# Patient Record
Sex: FEMALE | Race: WHITE | NOT HISPANIC OR LATINO | Employment: OTHER | URBAN - METROPOLITAN AREA
[De-identification: names, ages, dates, MRNs, and addresses within clinical notes are randomized per-mention and may not be internally consistent; named-entity substitution may affect disease eponyms.]

---

## 2017-11-02 ENCOUNTER — TRANSFERRED RECORDS (OUTPATIENT)
Dept: HEALTH INFORMATION MANAGEMENT | Facility: CLINIC | Age: 82
End: 2017-11-02

## 2017-11-06 ENCOUNTER — APPOINTMENT (OUTPATIENT)
Dept: CT IMAGING | Facility: CLINIC | Age: 82
End: 2017-11-06
Attending: PHYSICIAN ASSISTANT
Payer: MEDICARE

## 2017-11-06 ENCOUNTER — TRANSFERRED RECORDS (OUTPATIENT)
Dept: HEALTH INFORMATION MANAGEMENT | Facility: CLINIC | Age: 82
End: 2017-11-06

## 2017-11-06 ENCOUNTER — HOSPITAL ENCOUNTER (EMERGENCY)
Facility: CLINIC | Age: 82
Discharge: HOME | End: 2017-11-06
Attending: PHYSICIAN ASSISTANT | Admitting: PHYSICIAN ASSISTANT
Payer: MEDICARE

## 2017-11-06 VITALS
OXYGEN SATURATION: 99 % | TEMPERATURE: 97.7 F | HEART RATE: 67 BPM | DIASTOLIC BLOOD PRESSURE: 69 MMHG | SYSTOLIC BLOOD PRESSURE: 145 MMHG | RESPIRATION RATE: 16 BRPM

## 2017-11-06 DIAGNOSIS — R55 SYNCOPE AND COLLAPSE: ICD-10-CM

## 2017-11-06 LAB
ALBUMIN UR-MCNC: NEGATIVE MG/DL
ANION GAP SERPL CALCULATED.3IONS-SCNC: 7 MMOL/L (ref 3–14)
APPEARANCE UR: CLEAR
BASOPHILS # BLD AUTO: 0.1 10E9/L (ref 0–0.2)
BASOPHILS NFR BLD AUTO: 0.8 %
BILIRUB UR QL STRIP: NEGATIVE
BUN SERPL-MCNC: 30 MG/DL (ref 7–30)
CALCIUM SERPL-MCNC: 8.9 MG/DL (ref 8.5–10.1)
CHLORIDE SERPL-SCNC: 102 MMOL/L (ref 94–109)
CO2 SERPL-SCNC: 28 MMOL/L (ref 20–32)
COLOR UR AUTO: YELLOW
CREAT SERPL-MCNC: 1.03 MG/DL (ref 0.52–1.04)
D DIMER PPP FEU-MCNC: 0.4 UG/ML FEU (ref 0–0.5)
DIFFERENTIAL METHOD BLD: ABNORMAL
EOSINOPHIL # BLD AUTO: 0 10E9/L (ref 0–0.7)
EOSINOPHIL NFR BLD AUTO: 0.3 %
ERYTHROCYTE [DISTWIDTH] IN BLOOD BY AUTOMATED COUNT: 12.5 % (ref 10–15)
GFR SERPL CREATININE-BSD FRML MDRD: 50 ML/MIN/1.7M2
GLUCOSE SERPL-MCNC: 158 MG/DL (ref 70–99)
GLUCOSE UR STRIP-MCNC: NEGATIVE MG/DL
HCT VFR BLD AUTO: 39.3 % (ref 35–47)
HGB BLD-MCNC: 13 G/DL (ref 11.7–15.7)
HGB UR QL STRIP: NEGATIVE
IMM GRANULOCYTES # BLD: 0.1 10E9/L (ref 0–0.4)
IMM GRANULOCYTES NFR BLD: 0.5 %
INTERPRETATION ECG - MUSE: NORMAL
KETONES UR STRIP-MCNC: NEGATIVE MG/DL
LEUKOCYTE ESTERASE UR QL STRIP: ABNORMAL
LYMPHOCYTES # BLD AUTO: 1 10E9/L (ref 0.8–5.3)
LYMPHOCYTES NFR BLD AUTO: 9.3 %
MCH RBC QN AUTO: 28.5 PG (ref 26.5–33)
MCHC RBC AUTO-ENTMCNC: 33.1 G/DL (ref 31.5–36.5)
MCV RBC AUTO: 86 FL (ref 78–100)
MONOCYTES # BLD AUTO: 0.7 10E9/L (ref 0–1.3)
MONOCYTES NFR BLD AUTO: 6.3 %
MUCOUS THREADS #/AREA URNS LPF: PRESENT /LPF
NEUTROPHILS # BLD AUTO: 9.2 10E9/L (ref 1.6–8.3)
NEUTROPHILS NFR BLD AUTO: 82.8 %
NITRATE UR QL: NEGATIVE
NRBC # BLD AUTO: 0 10*3/UL
NRBC BLD AUTO-RTO: 0 /100
PH UR STRIP: 6 PH (ref 5–7)
PLATELET # BLD AUTO: 275 10E9/L (ref 150–450)
POTASSIUM SERPL-SCNC: 4 MMOL/L (ref 3.4–5.3)
RBC # BLD AUTO: 4.56 10E12/L (ref 3.8–5.2)
RBC #/AREA URNS AUTO: 1 /HPF (ref 0–2)
SODIUM SERPL-SCNC: 137 MMOL/L (ref 133–144)
SOURCE: ABNORMAL
SP GR UR STRIP: 1.01 (ref 1–1.03)
SQUAMOUS #/AREA URNS AUTO: 1 /HPF (ref 0–1)
TRANS CELLS #/AREA URNS HPF: <1 /HPF (ref 0–1)
TROPONIN I SERPL-MCNC: <0.015 UG/L (ref 0–0.04)
UROBILINOGEN UR STRIP-MCNC: 0 MG/DL (ref 0–2)
WBC # BLD AUTO: 11.1 10E9/L (ref 4–11)
WBC #/AREA URNS AUTO: 1 /HPF (ref 0–2)

## 2017-11-06 PROCEDURE — 85025 COMPLETE CBC W/AUTO DIFF WBC: CPT | Performed by: PHYSICIAN ASSISTANT

## 2017-11-06 PROCEDURE — 96361 HYDRATE IV INFUSION ADD-ON: CPT

## 2017-11-06 PROCEDURE — 93005 ELECTROCARDIOGRAM TRACING: CPT

## 2017-11-06 PROCEDURE — 99285 EMERGENCY DEPT VISIT HI MDM: CPT | Mod: 25

## 2017-11-06 PROCEDURE — 81001 URINALYSIS AUTO W/SCOPE: CPT | Performed by: PHYSICIAN ASSISTANT

## 2017-11-06 PROCEDURE — 70450 CT HEAD/BRAIN W/O DYE: CPT

## 2017-11-06 PROCEDURE — 96360 HYDRATION IV INFUSION INIT: CPT

## 2017-11-06 PROCEDURE — 80048 BASIC METABOLIC PNL TOTAL CA: CPT | Performed by: PHYSICIAN ASSISTANT

## 2017-11-06 PROCEDURE — 84484 ASSAY OF TROPONIN QUANT: CPT | Performed by: PHYSICIAN ASSISTANT

## 2017-11-06 PROCEDURE — 85379 FIBRIN DEGRADATION QUANT: CPT | Performed by: PHYSICIAN ASSISTANT

## 2017-11-06 PROCEDURE — 25000128 H RX IP 250 OP 636: Performed by: PHYSICIAN ASSISTANT

## 2017-11-06 RX ORDER — SODIUM CHLORIDE 9 MG/ML
1000 INJECTION, SOLUTION INTRAVENOUS CONTINUOUS
Status: DISCONTINUED | OUTPATIENT
Start: 2017-11-06 | End: 2017-11-06 | Stop reason: HOSPADM

## 2017-11-06 RX ADMIN — SODIUM CHLORIDE 1000 ML: 9 INJECTION, SOLUTION INTRAVENOUS at 11:37

## 2017-11-06 ASSESSMENT — ENCOUNTER SYMPTOMS
NUMBNESS: 0
FACIAL ASYMMETRY: 0
DIZZINESS: 0
LIGHT-HEADEDNESS: 0
CONFUSION: 0
WEAKNESS: 0
WOUND: 0
HEADACHES: 0

## 2017-11-06 NOTE — ED NOTES
Bed: ED20  Expected date: 11/6/17  Expected time: 10:50 AM  Means of arrival: Ambulance  Comments:  BV1

## 2017-11-06 NOTE — DISCHARGE INSTRUCTIONS
Possible Causes of Dizziness or Fainting    Dizziness and fainting can have many causes. Below are some examples of possible causes your healthcare provider will look to rule out.  Benign paroxysmal positional vertigo (BPPV)  BPPV results when calcium crystals inside the inner ear shift into the wrong position. BPPV causes episodes of vertigo (a spinning sensation). Episodes most often happen when the head is moved in a certain way. This is more common in people 65 and older.   Infection or inflammation  The semicircular canals of the ear may become infected or inflamed. In this case, they can send the wrong balance signals. This can cause vertigo.  Meniere disease  Meniere disease happens when there is too much fluid in the semicircular canals. This can cause vertigo. It also can cause hearing problems and buzzing or ringing in the ears (called tinnitus). You may also have a feeling of pressure or fullness in the ear.  Syncope  Syncope is fainting that happens when the brain doesn t get enough oxygen-rich blood. It can be caused by low heart rate or low blood pressure. This is called vasovagal syncope. It can also be caused by sitting or standing up too quickly. This is called orthostatic hypotension. Syncope may also be due to a heart valve problem, an abnormal heart rhythm, or other heart problems. Dizziness can also happen from stroke, hemorrhage in the brain, or other problems in the brain. Your healthcare provider may do certain tests to rule out these conditions.  Other causes  Other causes include:    Medicines. Certain medicines can cause dizziness and even fainting. In some cases, stopping a medicine too quickly can lead to withdrawal symptoms, including dizziness and fainting.    Anxiety. Being anxious can lead to breathing changes, such as hyperventilation. These can lead to dizziness and fainting.  Additional causes for dizziness and fainting also exist. Talk to your healthcare provider for more  information.     Date Last Reviewed: 10/6/2015    6166-3831 [employer]. [address] [place] [address], [place] [address]. All rights reserved. This information is not intended as a substitute for professional medical care. Always follow your healthcare professional's instructions.

## 2017-11-06 NOTE — ED NOTES
"Assumed patient care for RN break. Patient is alert and oriented. Very pleasant. States she feels \"fine now\". Patient requesting to use the restroom. ERT assisting pt to bathroom.   "

## 2017-11-06 NOTE — ED AVS SNAPSHOT
[facility]    [address]    [facility] [address]    Phone:  [phone]    Fax:  [phone]                                       Rebeca Izaguirre   MRN: [id number]    Department:  [facility]   Date of Visit:  11/6/2017           After Visit Summary Signature Page     I have received my discharge instructions, and my questions have been answered. I have discussed any challenges I see with this plan with the nurse or doctor.    ..........................................................................................................................................  Patient/Patient Representative Signature      ..........................................................................................................................................  Patient Representative Print Name and Relationship to Patient    ..................................................               ................................................  Date                                            Time    ..........................................................................................................................................  Reviewed by Signature/Title    ...................................................              ..............................................  Date                                                            Time

## 2017-11-06 NOTE — ED NOTES
Pt arrived in the ED via [facility] with reports of syncope; pt just ahd cataract surgery, went to the bathroom after being discharged and while waiting for the elevator, passed out.  Pt reportedly received insulin at the surgery center, she controls her diabetes with diet.  Pt is awake, alert, and oriented x4; pt is ambulatory with a steady gait.

## 2017-11-06 NOTE — ED PROVIDER NOTES
"  History     Chief Complaint:  Loss of Consciousness    HPI   Rebeca Izaguirre is a 89 year old female who presents via EMS with loss of consciousness. The patient states she had cataract surgery this morning, and she went to the bathroom after sitting in the surgery office for about 1 hour to make sure she was awake enough from the medications to go home. She walked to the elevators without problem, went to the bathroom and then walked back to the elevators to go home. This whole time she was feeling fine. Shortly after she developed feeling of wooziness so she walked over to sit down. The last thing she remembers is sitting down on the bench and seeing her son walk over to her. Her son, who was with her, watched her sit down and she started swaying so he went over to catch her and bring her down to the ground gently as she lost consciousness. The patient woke up quickly and remembers seeing \"a bunch of people staring at me\".  She states she was feeling fine after her event. The patient denies pain anywhere, confusion, headaches, weakness, slurred speech, facial asymmetry, history of heart problems, and states no other concerns at this time. She did not hit her head or harm herself. She states she did not have breakfast, and was given versed and ketamine before her surgery this morning.     Allergies:  No known drug allergies.      Medications:    Amlodipine 10mg   Asprin 81 mg  Lisinopril 20 mg   MVT  Simvastatin 20 mg    Past Medical History:    Diabetes  Hypertension  Cataracts   High cholesterol     Past Surgical History:    Cataract surgery     Family History:    History reviewed. No pertinent family history.     Social History:  Presents to the emergency department with her son.    Marital Status:    Smoking status: Never smoker   Alcohol use: No     Review of Systems   Skin: Negative for wound.   Neurological: Positive for syncope. Negative for dizziness, facial asymmetry, weakness, light-headedness, " numbness and headaches.   Psychiatric/Behavioral: Negative for behavioral problems and confusion.   All other systems reviewed and are negative.    Physical Exam     Patient Vitals for the past 24 hrs:   BP Temp Temp src Pulse Heart Rate Resp SpO2   11/06/17 1430 145/69 - - - 85 16 99 %   11/06/17 1245 149/74 - - - 84 (!) 32 99 %   11/06/17 1200 (!) 140/99 - - - 84 16 98 %   11/06/17 1130 144/72 - - - 72 18 98 %   11/06/17 1056 144/70 97.7  F (36.5  C) Oral 67 - 16 96 %      Physical Exam  Constitutional: Alert, attentive  HENT:    Nose: Nose normal.    Mouth/Throat: Oropharynx is clear, mucous membranes are moist  Eyes: EOM are normal. Pupils are equal, round, and reactive to light.   CV: Regular rate and rhythm. No murmur heard.  Chest: Effort normal and breath sounds normal.   GI: No distension. There is no tenderness  MSK: Normal range of motion.   Neurological:   GCS 15; A/Ox3; Cranial nerves 2-12 intact;   5/5 strength throughout the upper and lower extremities;   sensation intact to light touch throughout the upper and lower extremities;   normal fine motor coordination intact bilaterally;   normal gait   No meningismus   Skin: Skin is warm and dry.     Emergency Department Course     ECG:  @ 1141  Indication: Loss of consciousness    Vent. Rate 76 bpm. MI interval 156 ms. QRS duration 62 ms. QT/QTc 392/441 ms. P-R-T axis 77 7 29.   NSR, normal ECG.    Read @ 1145 by Dr. Medel.     Imaging:  Radiology findings were communicated with the patient and family who voiced understanding of the findings.  CT Head w/o Contrast   Final Result   IMPRESSION:      1. No evidence of acute intracranial hemorrhage, mass, or herniation.   2. There is generalized atrophy of the brain. White matter changes are   present in the cerebral hemispheres that are consistent with small   vessel ischemic disease in this age patient.      JOHN PEGUERO MD         Laboratory:  Laboratory findings were communicated with the patient and  family who voiced understanding of the findings.  UA: Clear yellow urine, trace leuk-esterase, mucus present, otherwise WNL  CBC: WBC 11.1 (H), o/w WNL. (HGB 13.0, )   (1134) D Dimer: 0.4  BMP: Glucose 158 (H), GFR 50 (L) o/w WNL (Creatinine 1.03)    Troponin (Collected 1134): <0.015     Interventions:  1137: NS, 1 L, IV     Emergency Department Course:  Nursing notes and vitals reviewed.  I performed an exam of the patient as documented above.   The patient was sent for a head CT without contrast while in the emergency department, results above.   IV was inserted and blood was drawn for laboratory testing, results above.  The patient provided a urine sample here in the emergency department. This was sent for laboratory testing, findings above.  1456: Patient rechecked and updated.    Findings and plan explained to the patient and her son. Patient discharged home with instructions regarding supportive care, medications, and reasons to return. The importance of close follow-up was reviewed.    I personally reviewed the laboratory and imaging results with the Patient and son and answered all related questions prior to discharge.      Impression & Plan      Medical Decision Making:   Rebeca Izaguirre is a 89 year old female who presents to the emergency department today for evaluation after a syncopal event. While vasovagal versus side effect of the Versed from surgery was the most likely etiology given the history of this patient, I considered a broad differential for their syncope today including cardiac arrythmia, ACS, aortic stenosis, HOCM, PE, orthostatic hypotension, drugs, situational, carotid hypersensitivity, seizure, TIA, stroke, vasovagal. She has no signs of a concerning etiology for syncope at this point. Did not hear any murmur on exam. In addition, she has no family history of sudden death, no chest pain, no seizure activity or post-ictal period, no murmur, and no signs of orthostasis in the ED, no  focal neurologic symptoms, and no complaints of concerning headache. The workup in the ED is negative and the physical exam is re-assurring. She has ambulated by herself and without difficulty several times to the bathroom and has been feeling well and normal since she fainted. She has no other injuries that require work up. Supportive outpatient management is therefore indicated.      Diagnosis:    ICD-10-CM    1. Syncope and collapse R55     likely vasovagal after cataract surgery      Disposition:   Discharged to home      Scribe Disclosure:  Kevin GARCIA, am serving as a scribe at 11:04 AM on 11/6/2017 to document services personally performed by Yuko Min*, based on my observations and the provider's statements to me.   [facility] EMERGENCY DEPARTMENT     Yuko Min PA-C  11/06/17 1812

## 2017-11-06 NOTE — ED AVS SNAPSHOT
[facility]    [address]    [facility] [address]    Phone:  [phone]    Fax:  [phone]                                       Rebeca Izaguirre   MRN: [id number]    Department:  [facility]   Date of Visit:  11/6/2017           Patient Information     Date Of Birth          1/28/1928        Your diagnoses for this visit were:     Syncope and collapse likely vasovagal after cataract surgery       You were seen by Yuko Min PA-C.      Follow-up Information     Follow up with Ana Maria Jordan MD In 2 days.    Specialty:  Family Practice    Contact information:    [address] [address] [phone]          Follow up with [facility].    Specialty:  EMERGENCY MEDICINE    Why:  If symptoms worsen    Contact information:    [address]  [facility] [address] [phone]        Discharge Instructions         Possible Causes of Dizziness or Fainting    Dizziness and fainting can have many causes. Below are some examples of possible causes your healthcare provider will look to rule out.  Benign paroxysmal positional vertigo (BPPV)  BPPV results when calcium crystals inside the inner ear shift into the wrong position. BPPV causes episodes of vertigo (a spinning sensation). Episodes most often happen when the head is moved in a certain way. This is more common in people 65 and older.   Infection or inflammation  The semicircular canals of the ear may become infected or inflamed. In this case, they can send the wrong balance signals. This can cause vertigo.  Meniere disease  Meniere disease happens when there is too much fluid in the semicircular canals. This can cause vertigo. It also can cause hearing problems and buzzing or ringing in the ears (called tinnitus). You may also have a feeling of pressure or fullness in the  ear.  Syncope  Syncope is fainting that happens when the brain doesn t get enough oxygen-rich blood. It can be caused by low heart rate or low blood pressure. This is called vasovagal syncope. It can also be caused by sitting or standing up too quickly. This is called orthostatic hypotension. Syncope may also be due to a heart valve problem, an abnormal heart rhythm, or other heart problems. Dizziness can also happen from stroke, hemorrhage in the brain, or other problems in the brain. Your healthcare provider may do certain tests to rule out these conditions.  Other causes  Other causes include:    Medicines. Certain medicines can cause dizziness and even fainting. In some cases, stopping a medicine too quickly can lead to withdrawal symptoms, including dizziness and fainting.    Anxiety. Being anxious can lead to breathing changes, such as hyperventilation. These can lead to dizziness and fainting.  Additional causes for dizziness and fainting also exist. Talk to your healthcare provider for more information.     Date Last Reviewed: 10/6/2015    5633-8836 [employer]. [address] [place] [address]. All rights reserved. This information is not intended as a substitute for professional medical care. Always follow your healthcare professional's instructions.          24 Hour Appointment Hotline       To make an appointment at any [facility], call [phone] ([phone]). If you don't have a family doctor or clinic, we will help you find one. [place] clinics are conveniently located to serve the needs of you and your family.             Review of your medicines      Notice     You have not been prescribed any medications.            Procedures and tests performed during your visit     Procedure/Test Number of Times Performed    Basic metabolic panel 1    CBC with platelets differential 1    CT Head w/o Contrast 1    Cardiac Continuous Monitoring 2    D dimer quantitative 1     EKG 12-lead, tracing only 1    Peripheral IV: Standard 1    Troponin I 1    UA with Microscopic 1      Orders Needing Specimen Collection     None      Pending Results     No orders found from 11/4/2017 to 11/7/2017.            Pending Culture Results     No orders found from 11/4/2017 to 11/7/2017.            Pending Results Instructions     If you had any lab results that were not finalized at the time of your Discharge, you can call the ED Lab Result RN at [phone]. You will be contacted by this team for any positive Lab results or changes in treatment. The nurses are available 7 days a week from 10A to 6:30P.  You can leave a message 24 hours per day and they will return your call.        Test Results From Your Hospital Stay        11/6/2017 12:11 PM      Component Results     Component Value Ref Range & Units Status    WBC 11.1 (H) 4.0 - 11.0 10e9/L Final    RBC Count 4.56 3.8 - 5.2 10e12/L Final    Hemoglobin 13.0 11.7 - 15.7 g/dL Final    Hematocrit 39.3 35.0 - 47.0 % Final    MCV 86 78 - 100 fl Final    MCH 28.5 26.5 - 33.0 pg Final    MCHC 33.1 31.5 - 36.5 g/dL Final    RDW 12.5 10.0 - 15.0 % Final    Platelet Count 275 150 - 450 10e9/L Final    Diff Method Automated Method  Final    % Neutrophils 82.8 % Final    % Lymphocytes 9.3 % Final    % Monocytes 6.3 % Final    % Eosinophils 0.3 % Final    % Basophils 0.8 % Final    % Immature Granulocytes 0.5 % Final    Nucleated RBCs 0 0 /100 Final    Absolute Neutrophil 9.2 (H) 1.6 - 8.3 10e9/L Final    Absolute Lymphocytes 1.0 0.8 - 5.3 10e9/L Final    Absolute Monocytes 0.7 0.0 - 1.3 10e9/L Final    Absolute Eosinophils 0.0 0.0 - 0.7 10e9/L Final    Absolute Basophils 0.1 0.0 - 0.2 10e9/L Final    Abs Immature Granulocytes 0.1 0 - 0.4 10e9/L Final    Absolute Nucleated RBC 0.0  Final         11/6/2017 12:27 PM      Component Results     Component Value Ref Range & Units Status    D Dimer 0.4 0.0 - 0.50 ug/ml FEU Final    This D-dimer assay is intended for  use in conjunction with a clinical pretest   probability assessment model to exclude pulmonary embolism (PE) and deep   venous thrombosis (DVT) in outpatients suspected of PE or DVT. The cut-off   value is 0.5 ug/mL FEU.           11/6/2017 12:31 PM      Component Results     Component Value Ref Range & Units Status    Sodium 137 133 - 144 mmol/L Final    Potassium 4.0 3.4 - 5.3 mmol/L Final    Chloride 102 94 - 109 mmol/L Final    Carbon Dioxide 28 20 - 32 mmol/L Final    Anion Gap 7 3 - 14 mmol/L Final    Glucose 158 (H) 70 - 99 mg/dL Final    Urea Nitrogen 30 7 - 30 mg/dL Final    Creatinine 1.03 0.52 - 1.04 mg/dL Final    GFR Estimate 50 (L) >60 mL/min/1.7m2 Final    Non  GFR Calc    GFR Estimate If Black 61 >60 mL/min/1.7m2 Final    African American GFR Calc    Calcium 8.9 8.5 - 10.1 mg/dL Final         11/6/2017 12:31 PM      Component Results     Component Value Ref Range & Units Status    Troponin I ES <0.015 0.000 - 0.045 ug/L Final    The 99th percentile for upper reference range is 0.045 ug/L.  Troponin values   in the range of 0.045 - 0.120 ug/L may be associated with risks of adverse   clinical events.           11/6/2017  1:40 PM      Component Results     Component Value Ref Range & Units Status    Color Urine Yellow  Final    Appearance Urine Clear  Final    Glucose Urine Negative NEG^Negative mg/dL Final    Bilirubin Urine Negative NEG^Negative Final    Ketones Urine Negative NEG^Negative mg/dL Final    Specific Gravity Urine 1.009 1.003 - 1.035 Final    Blood Urine Negative NEG^Negative Final    pH Urine 6.0 5.0 - 7.0 pH Final    Protein Albumin Urine Negative NEG^Negative mg/dL Final    Urobilinogen mg/dL 0.0 0.0 - 2.0 mg/dL Final    Nitrite Urine Negative NEG^Negative Final    Leukocyte Esterase Urine Trace (A) NEG^Negative Final    Source Midstream Urine  Final    WBC Urine 1 0 - 2 /HPF Final    RBC Urine 1 0 - 2 /HPF Final    Squamous Epithelial /HPF Urine 1 0 - 1 /HPF Final     Transitional Epi <1 0 - 1 /HPF Final    Mucous Urine Present (A) NEG^Negative /LPF Final         11/6/2017 12:11 PM      Narrative     CT SCAN OF THE HEAD WITHOUT CONTRAST   11/6/2017 12:00 PM     HISTORY: Syncope.    TECHNIQUE:  Axial images of the head and coronal reformations without  IV contrast material. Radiation dose for this scan was reduced using  automated exposure control, adjustment of the mA and/or kV according  to patient size, or iterative reconstruction technique.    COMPARISON: None.    FINDINGS: There is no evidence of intracranial hemorrhage, mass, acute  infarct or anomaly. There is generalized atrophy of the brain. There  is low attenuation in the white matter of the cerebral hemispheres  consistent with sequelae of small vessel ischemic disease.    The visualized portions of the sinuses and mastoids appear normal. The  bony calvarium and bones of the skull base appear intact. There are  bilateral intraocular lens implants.        Impression     IMPRESSION:     1. No evidence of acute intracranial hemorrhage, mass, or herniation.  2. There is generalized atrophy of the brain. White matter changes are  present in the cerebral hemispheres that are consistent with small  vessel ischemic disease in this age patient.    JOHN PEGUERO MD                Clinical Quality Measure: Blood Pressure Screening     Your blood pressure was checked while you were in the emergency department today. The last reading we obtained was  BP: 145/69 . Please read the guidelines below about what these numbers mean and what you should do about them.  If your systolic blood pressure (the top number) is less than 120 and your diastolic blood pressure (the bottom number) is less than 80, then your blood pressure is normal. There is nothing more that you need to do about it.  If your systolic blood pressure (the top number) is 120-139 or your diastolic blood pressure (the bottom number) is 80-89, your blood pressure may be  "higher than it should be. You should have your blood pressure rechecked within a year by a primary care provider.  If your systolic blood pressure (the top number) is 140 or greater or your diastolic blood pressure (the bottom number) is 90 or greater, you may have high blood pressure. High blood pressure is treatable, but if left untreated over time it can put you at risk for heart attack, stroke, or kidney failure. You should have your blood pressure rechecked by a primary care provider within the next 4 weeks.  If your provider in the emergency department today gave you specific instructions to follow-up with your doctor or provider even sooner than that, you should follow that instruction and not wait for up to 4 weeks for your follow-up visit.        Thank you for choosing [facility]       Thank you for choosing [facility] for your care. Our goal is always to provide you with excellent care. Hearing back from our patients is one way we can continue to improve our services. Please take a few minutes to complete the written survey that you may receive in the mail after you visit with us. Thank you!        "[employer] Information     [employer] lets you send messages to your doctor, view your test results, renew your prescriptions, schedule appointments and more. To sign up, go to [email or web address] . Click on \"Log in\" on the left side of the screen, which will take you to the Welcome page. Then click on \"Sign up Now\" on the right side of the page.     You will be asked to enter the access code listed below, as well as some personal information. Please follow the directions to create your username and password.     Your access code is: [email or web address]  Expires: 2018  3:02 PM     Your access code will  in 90 days. If you need help or a new code, please call your [facility] clinic or [phone].        Care EveryWhere ID     This is your Care EveryWhere ID. This could be used by other organizations to access your " [facility] medical records  [id number]        Equal Access to Services     RONNI ISABEL : Hadii jayla Barba, chiqui sawyer, della lopez, joe nathan. So [facility] [phone].    ATENCIÓN: Si habla español, tiene a thao disposición servicios gratuitos de asistencia lingüística. Llame al [phone].    We comply with applicable federal civil rights laws and [place] laws. We do not discriminate on the basis of race, color, national origin, age, disability, sex, sexual orientation, or gender identity.            After Visit Summary       This is your record. Keep this with you and show to your community pharmacist(s) and doctor(s) at your next visit.

## 2017-11-06 NOTE — ED NOTES
Pt discharged at this time; instructions understood, no prescriptions given.  Pt is awake, alert, and oriented x4; pt is ambulatory with a steady gait.  Pt accompanied by family home.

## 2018-03-18 ENCOUNTER — HOSPITAL ENCOUNTER (INPATIENT)
Facility: CLINIC | Age: 83
LOS: 3 days | Discharge: HOME | DRG: 394 | End: 2018-03-21
Attending: EMERGENCY MEDICINE | Admitting: INTERNAL MEDICINE
Payer: MEDICARE

## 2018-03-18 ENCOUNTER — APPOINTMENT (OUTPATIENT)
Dept: CT IMAGING | Facility: CLINIC | Age: 83
DRG: 394 | End: 2018-03-18
Attending: EMERGENCY MEDICINE
Payer: MEDICARE

## 2018-03-18 DIAGNOSIS — K92.1 HEMATOCHEZIA: ICD-10-CM

## 2018-03-18 PROBLEM — K92.2 GI BLEEDING: Status: ACTIVE | Noted: 2018-03-18

## 2018-03-18 LAB
ABO + RH BLD: NORMAL
ABO + RH BLD: NORMAL
ALBUMIN SERPL-MCNC: 3.9 G/DL (ref 3.4–5)
ALP SERPL-CCNC: 79 U/L (ref 40–150)
ALT SERPL W P-5'-P-CCNC: 28 U/L (ref 0–50)
ANION GAP SERPL CALCULATED.3IONS-SCNC: 8 MMOL/L (ref 3–14)
AST SERPL W P-5'-P-CCNC: 20 U/L (ref 0–45)
BASOPHILS # BLD AUTO: 0 10E9/L (ref 0–0.2)
BASOPHILS NFR BLD AUTO: 0.2 %
BILIRUB SERPL-MCNC: 0.8 MG/DL (ref 0.2–1.3)
BLD GP AB SCN SERPL QL: NORMAL
BLOOD BANK CMNT PATIENT-IMP: NORMAL
BUN SERPL-MCNC: 28 MG/DL (ref 7–30)
C DIFF TOX B STL QL: NEGATIVE
CALCIUM SERPL-MCNC: 8.9 MG/DL (ref 8.5–10.1)
CHLORIDE SERPL-SCNC: 98 MMOL/L (ref 94–109)
CO2 SERPL-SCNC: 25 MMOL/L (ref 20–32)
CREAT SERPL-MCNC: 0.87 MG/DL (ref 0.52–1.04)
DIFFERENTIAL METHOD BLD: ABNORMAL
EOSINOPHIL # BLD AUTO: 0 10E9/L (ref 0–0.7)
EOSINOPHIL NFR BLD AUTO: 0 %
ERYTHROCYTE [DISTWIDTH] IN BLOOD BY AUTOMATED COUNT: 13.2 % (ref 10–15)
GFR SERPL CREATININE-BSD FRML MDRD: 61 ML/MIN/1.7M2
GLUCOSE BLDC GLUCOMTR-MCNC: 141 MG/DL (ref 70–99)
GLUCOSE BLDC GLUCOMTR-MCNC: 157 MG/DL (ref 70–99)
GLUCOSE SERPL-MCNC: 207 MG/DL (ref 70–99)
HBA1C MFR BLD: 7.3 % (ref 4.3–6)
HCT VFR BLD AUTO: 37.9 % (ref 35–47)
HEMOCCULT STL QL: POSITIVE
HGB BLD-MCNC: 12.6 G/DL (ref 11.7–15.7)
HGB BLD-MCNC: 12.6 G/DL (ref 11.7–15.7)
IMM GRANULOCYTES # BLD: 0.1 10E9/L (ref 0–0.4)
IMM GRANULOCYTES NFR BLD: 0.5 %
INR PPP: 1.06 (ref 0.86–1.14)
LYMPHOCYTES # BLD AUTO: 0.9 10E9/L (ref 0.8–5.3)
LYMPHOCYTES NFR BLD AUTO: 4.6 %
MCH RBC QN AUTO: 27.3 PG (ref 26.5–33)
MCHC RBC AUTO-ENTMCNC: 33.2 G/DL (ref 31.5–36.5)
MCV RBC AUTO: 82 FL (ref 78–100)
MONOCYTES # BLD AUTO: 1.1 10E9/L (ref 0–1.3)
MONOCYTES NFR BLD AUTO: 5.5 %
NEUTROPHILS # BLD AUTO: 17.8 10E9/L (ref 1.6–8.3)
NEUTROPHILS NFR BLD AUTO: 89.2 %
NRBC # BLD AUTO: 0 10*3/UL
NRBC BLD AUTO-RTO: 0 /100
PLATELET # BLD AUTO: 296 10E9/L (ref 150–450)
POTASSIUM SERPL-SCNC: 3.9 MMOL/L (ref 3.4–5.3)
PROT SERPL-MCNC: 7.9 G/DL (ref 6.8–8.8)
RBC # BLD AUTO: 4.62 10E12/L (ref 3.8–5.2)
SODIUM SERPL-SCNC: 131 MMOL/L (ref 133–144)
SPECIMEN EXP DATE BLD: NORMAL
SPECIMEN SOURCE: NORMAL
WBC # BLD AUTO: 20 10E9/L (ref 4–11)

## 2018-03-18 PROCEDURE — 25000128 H RX IP 250 OP 636: Performed by: EMERGENCY MEDICINE

## 2018-03-18 PROCEDURE — 85610 PROTHROMBIN TIME: CPT | Performed by: EMERGENCY MEDICINE

## 2018-03-18 PROCEDURE — 99223 1ST HOSP IP/OBS HIGH 75: CPT | Mod: AI | Performed by: INTERNAL MEDICINE

## 2018-03-18 PROCEDURE — 96361 HYDRATE IV INFUSION ADD-ON: CPT

## 2018-03-18 PROCEDURE — 83036 HEMOGLOBIN GLYCOSYLATED A1C: CPT | Performed by: INTERNAL MEDICINE

## 2018-03-18 PROCEDURE — 96374 THER/PROPH/DIAG INJ IV PUSH: CPT | Mod: 59

## 2018-03-18 PROCEDURE — 96375 TX/PRO/DX INJ NEW DRUG ADDON: CPT

## 2018-03-18 PROCEDURE — 85018 HEMOGLOBIN: CPT | Performed by: INTERNAL MEDICINE

## 2018-03-18 PROCEDURE — 86850 RBC ANTIBODY SCREEN: CPT | Performed by: EMERGENCY MEDICINE

## 2018-03-18 PROCEDURE — 12000000 ZZH R&B MED SURG/OB

## 2018-03-18 PROCEDURE — 74177 CT ABD & PELVIS W/CONTRAST: CPT

## 2018-03-18 PROCEDURE — 82272 OCCULT BLD FECES 1-3 TESTS: CPT | Performed by: EMERGENCY MEDICINE

## 2018-03-18 PROCEDURE — 00000146 ZZHCL STATISTIC GLUCOSE BY METER IP

## 2018-03-18 PROCEDURE — 80053 COMPREHEN METABOLIC PANEL: CPT | Performed by: EMERGENCY MEDICINE

## 2018-03-18 PROCEDURE — 36415 COLL VENOUS BLD VENIPUNCTURE: CPT | Performed by: INTERNAL MEDICINE

## 2018-03-18 PROCEDURE — 25000125 ZZHC RX 250: Performed by: EMERGENCY MEDICINE

## 2018-03-18 PROCEDURE — 25800025 ZZH RX 258: Performed by: INTERNAL MEDICINE

## 2018-03-18 PROCEDURE — 86900 BLOOD TYPING SEROLOGIC ABO: CPT | Performed by: EMERGENCY MEDICINE

## 2018-03-18 PROCEDURE — 87493 C DIFF AMPLIFIED PROBE: CPT | Performed by: EMERGENCY MEDICINE

## 2018-03-18 PROCEDURE — 86901 BLOOD TYPING SEROLOGIC RH(D): CPT | Performed by: EMERGENCY MEDICINE

## 2018-03-18 PROCEDURE — 85025 COMPLETE CBC W/AUTO DIFF WBC: CPT | Performed by: EMERGENCY MEDICINE

## 2018-03-18 PROCEDURE — 87506 IADNA-DNA/RNA PROBE TQ 6-11: CPT | Performed by: EMERGENCY MEDICINE

## 2018-03-18 PROCEDURE — 99285 EMERGENCY DEPT VISIT HI MDM: CPT | Mod: 25

## 2018-03-18 RX ORDER — ONDANSETRON 4 MG/1
4 TABLET, ORALLY DISINTEGRATING ORAL EVERY 6 HOURS PRN
Status: DISCONTINUED | OUTPATIENT
Start: 2018-03-18 | End: 2018-03-21 | Stop reason: HOSPADM

## 2018-03-18 RX ORDER — ACETAMINOPHEN 325 MG/1
650 TABLET ORAL EVERY 4 HOURS PRN
Status: DISCONTINUED | OUTPATIENT
Start: 2018-03-18 | End: 2018-03-21 | Stop reason: HOSPADM

## 2018-03-18 RX ORDER — LISINOPRIL 10 MG/1
10 TABLET ORAL DAILY
Status: DISCONTINUED | OUTPATIENT
Start: 2018-03-19 | End: 2018-03-21 | Stop reason: HOSPADM

## 2018-03-18 RX ORDER — PROCHLORPERAZINE MALEATE 5 MG
5 TABLET ORAL EVERY 6 HOURS PRN
Status: DISCONTINUED | OUTPATIENT
Start: 2018-03-18 | End: 2018-03-21 | Stop reason: HOSPADM

## 2018-03-18 RX ORDER — AMLODIPINE BESYLATE 10 MG/1
10 TABLET ORAL DAILY
Status: DISCONTINUED | OUTPATIENT
Start: 2018-03-19 | End: 2018-03-21 | Stop reason: HOSPADM

## 2018-03-18 RX ORDER — PROCHLORPERAZINE 25 MG
12.5 SUPPOSITORY, RECTAL RECTAL EVERY 12 HOURS PRN
Status: DISCONTINUED | OUTPATIENT
Start: 2018-03-18 | End: 2018-03-21 | Stop reason: HOSPADM

## 2018-03-18 RX ORDER — ONDANSETRON 2 MG/ML
4 INJECTION INTRAMUSCULAR; INTRAVENOUS EVERY 6 HOURS PRN
Status: DISCONTINUED | OUTPATIENT
Start: 2018-03-18 | End: 2018-03-21 | Stop reason: HOSPADM

## 2018-03-18 RX ORDER — SODIUM CHLORIDE 9 MG/ML
1000 INJECTION, SOLUTION INTRAVENOUS CONTINUOUS
Status: DISCONTINUED | OUTPATIENT
Start: 2018-03-18 | End: 2018-03-19

## 2018-03-18 RX ORDER — VIT A/VIT C/VIT E/ZINC/COPPER 4296-226
1 CAPSULE ORAL 2 TIMES DAILY
COMMUNITY

## 2018-03-18 RX ORDER — LIDOCAINE 40 MG/G
CREAM TOPICAL
Status: DISCONTINUED | OUTPATIENT
Start: 2018-03-18 | End: 2018-03-19

## 2018-03-18 RX ORDER — NICOTINE POLACRILEX 4 MG
15-30 LOZENGE BUCCAL
Status: DISCONTINUED | OUTPATIENT
Start: 2018-03-18 | End: 2018-03-21 | Stop reason: HOSPADM

## 2018-03-18 RX ORDER — IOPAMIDOL 755 MG/ML
500 INJECTION, SOLUTION INTRAVASCULAR ONCE
Status: COMPLETED | OUTPATIENT
Start: 2018-03-18 | End: 2018-03-18

## 2018-03-18 RX ORDER — NALOXONE HYDROCHLORIDE 0.4 MG/ML
.1-.4 INJECTION, SOLUTION INTRAMUSCULAR; INTRAVENOUS; SUBCUTANEOUS
Status: DISCONTINUED | OUTPATIENT
Start: 2018-03-18 | End: 2018-03-21 | Stop reason: HOSPADM

## 2018-03-18 RX ORDER — DEXTROSE MONOHYDRATE 25 G/50ML
25-50 INJECTION, SOLUTION INTRAVENOUS
Status: DISCONTINUED | OUTPATIENT
Start: 2018-03-18 | End: 2018-03-21 | Stop reason: HOSPADM

## 2018-03-18 RX ORDER — ONDANSETRON 2 MG/ML
4 INJECTION INTRAMUSCULAR; INTRAVENOUS ONCE
Status: COMPLETED | OUTPATIENT
Start: 2018-03-18 | End: 2018-03-18

## 2018-03-18 RX ORDER — DEXTROSE MONOHYDRATE, SODIUM CHLORIDE, AND POTASSIUM CHLORIDE 50; 1.49; 9 G/1000ML; G/1000ML; G/1000ML
INJECTION, SOLUTION INTRAVENOUS CONTINUOUS
Status: DISCONTINUED | OUTPATIENT
Start: 2018-03-18 | End: 2018-03-21 | Stop reason: HOSPADM

## 2018-03-18 RX ADMIN — IOPAMIDOL 61 ML: 755 INJECTION, SOLUTION INTRAVENOUS at 12:04

## 2018-03-18 RX ADMIN — ONDANSETRON 4 MG: 2 INJECTION INTRAMUSCULAR; INTRAVENOUS at 10:19

## 2018-03-18 RX ADMIN — POTASSIUM CHLORIDE, DEXTROSE MONOHYDRATE AND SODIUM CHLORIDE: 150; 5; 900 INJECTION, SOLUTION INTRAVENOUS at 18:08

## 2018-03-18 RX ADMIN — PANTOPRAZOLE SODIUM 40 MG: 40 INJECTION, POWDER, FOR SOLUTION INTRAVENOUS at 10:19

## 2018-03-18 RX ADMIN — SODIUM CHLORIDE 500 ML: 9 INJECTION, SOLUTION INTRAVENOUS at 10:19

## 2018-03-18 RX ADMIN — SODIUM CHLORIDE 55 ML: 9 INJECTION, SOLUTION INTRAVENOUS at 12:11

## 2018-03-18 ASSESSMENT — ENCOUNTER SYMPTOMS
NAUSEA: 1
VOMITING: 0
LIGHT-HEADEDNESS: 0
ANAL BLEEDING: 1
BLOOD IN STOOL: 1
DIARRHEA: 1
DIZZINESS: 0

## 2018-03-18 NOTE — H&P
Admitted:     03/18/2018      CHIEF COMPLAINT:  Blood in stools.      HISTORY OF PRESENT ILLNESS:  Ms. Izaguirre is a 89-year-old woman who presents with blood in her stools.  She said she was feeling well during the day yesterday and was out shopping with her daughter.  They ate dinner at [employer] at about 6 p.m.  She had a fish sandwich, fries and iced tea.  At about 8 p.m. she felt that she had to have a bowel movement.  She had a large bowel movement with some blood associated.  She has never had any blood in her stools in the past.  Later that night she began feeling nausea, but did not vomit.  She had multiple loose stools overnight with blood each time and blood with wiping.  Her daughter convinced her to come in the Emergency Department this morning.  She had another bloody bowel movement in the ER this morning.  She is feeling a bit weak, but otherwise okay.  She has had no abdominal pain.  Her daughter had the same meal as her except for a different drink and her daughter is not ill.  Ms. Izaguirre not had any antibiotics or hospitalizations.  She is still having some nausea this morning and has not been able to eat.  She has not had a colonoscopy in the past, but had a flex-sig several years ago which appears to have been a screening for colon cancer.  She said it was unremarkable.  She does take an aspirin a day, but not any other NSAIDs or anticoagulants.      PAST MEDICAL HISTORY:   1.  Hypertension.   2.  Diet-controlled type 2 diabetes.   3.  Cholecystectomy in 2000.   4.  Cataract surgeries.      MEDICATIONS:   1.  Aspirin 81 mg a day.   2.  Amlodipine 10 mg a day.   3.  Lisinopril 10 mg a day.   4.  Multivitamin 1 tab per day.   5.  Simvastatin 10 mg a day.   6.  Cholecalciferol 1000 units daily.      FAMILY HISTORY:  Reviewed and noncontributory.  She thinks her parents may have had diabetes.      SOCIAL HISTORY:  She lives independently in a single-family home.  She lived there for over 50 years.  Her  daughter lives in the area and is present in the emergency department.  The patient does not smoke and rarely drinks alcohol.      REVIEW OF SYSTEMS:  A complete 10-point review of systems was performed and is negative except for the points mentioned in history of present illness.      I reviewed previous medical records in Epic.  I discussed the case in detail with the ER physician and the patient's daughter.      PHYSICAL EXAMINATION:   VITAL SIGNS:  Blood pressure is 134/70, pulse 94, temp 98.4 and oxygen saturation 95% on room air.   GENERAL:  She is alert, pleasant and cooperative, in no apparent distress.   HEENT:  Pupils are round and equal.  Conjunctivae, sclerae and lids are within normal limits.  Oropharynx is pink and moist.  Teeth and lips are within normal limits.   NECK:  Supple, with no masses, no thyromegaly.     CARDIOVASCULAR:  Heart tachycardic with a regular rhythm.  No murmurs.   LUNGS:  Clear to auscultation bilaterally with normal respiratory effort.   ABDOMEN:  Soft, nontender, there are no masses.  Bowel sounds are active.   EXTREMITIES:  There is no edema.   NEUROLOGIC:  Strength and sensation are intact in all 4 extremities.  Cranial nerves II through XII are grossly intact.   PSYCHIATRIC:  Mood and affect appear within normal limits.   SKIN:  No rashes are noted on limited exam.      LABORATORY DATA:  Sodium 134, potassium 3.9, chloride 98, bicarb 25, BUN 28, creatinine 0.87, glucose 207.  White blood cell count is 20,000, hemoglobin 12.6, platelets 296.  INR is 1.0.        ASSESSMENT AND PLAN:  Ms. Izaguirre is a 89-year-old woman who presents with multiple episodes of hematochezia.  She has a history of diet-controlled diabetes and hypertension.   1.  Hematochezia:  There is a wide differential diagnosis including an infectious etiology versus hemorrhoids versus diverticulosis versus malignancy.  Fortunately, her blood pressure is stable.  She is mildly symptomatic and is still having  nausea.  We will monitor serial hemoglobins and CT abdomen has been ordered in the emergency department and is pending.  She has been typed and screened.  We will obtain stool studies to assess for bacterial pathogens, as well as C. diff.  She will be given antiemetics as needed.  We will defer on proton pump inhibitor for now.  This appears to be a lower GI source.  We will request a gastroenterology consultation to assess if further intervention would be indicated.  She will be n.p.o. except for meds and water for now.   2.  Type 2 diabetes:  She does not take medications for this.  We will add sliding scale insulin as needed.   3.  Leukocytosis:  This could be a stress response or potentially related to an infectious etiology for her symptoms.  As mentioned below, we will check stool studies for infection.  We will recheck a white count tomorrow.   4.  Hypertension:  We will resume her amlodipine, lisinopril with parameters.     5.  Primary care:  She reports she currently does not have a primary care physician and I indicated we will give her resources for [place] physicians prior to her discharge.  She lives just a few blocks away from the hospital.         SIVA BANGURA MD             D: 2018   T: 2018   MT: NTS      Name:     CLARA MYRICK   MRN:      [id number]        Account:      [id number]   :      1928        Admitted:     2018                   Document: [id number]

## 2018-03-18 NOTE — IP AVS SNAPSHOT
[facility]    [address]    [facility] [address]    Phone:  [phone]    Fax:  [phone]                                       After Visit Summary   3/18/2018    Rebeca Izaguirre    MRN: [id number]           After Visit Summary Signature Page     I have received my discharge instructions, and my questions have been answered. I have discussed any challenges I see with this plan with the nurse or doctor.    ..........................................................................................................................................  Patient/Patient Representative Signature      ..........................................................................................................................................  Patient Representative Print Name and Relationship to Patient    ..................................................               ................................................  Date                                            Time    ..........................................................................................................................................  Reviewed by Signature/Title    ...................................................              ..............................................  Date                                                            Time

## 2018-03-18 NOTE — H&P
Full H and P dictated.   89 YO woman with several episodes of hematochezia over night.  Previously heathy.  - Stool studies   - serial HGB  - CT pending  - IVF  - typed and screened  - GI consult

## 2018-03-18 NOTE — PHARMACY-ADMISSION MEDICATION HISTORY
Admission medication history interview status for this patient is complete. See [facility] admission navigator for allergy information, prior to admission medications and immunization status.     Medication history interview source(s):Patient  Medication history resources (including written lists, pill bottles, clinic record):None  Primary pharmacy:[facility]     Changes made to PTA medication list:  Added: none   Deleted: none  Changed: none     Actions taken by pharmacist (provider contacted, etc):None     Additional medication history information:None    Medication reconciliation/reorder completed by provider prior to medication history? No    Do you take OTC medications (eg tylenol, ibuprofen, fish oil, eye/ear drops, etc)? N(Y/N)    For patients on insulin therapy: N (Y/N)  Lantus/levemir/NPH/Mix 70/30 dose:   (Y/N) (see Med list for doses)   Sliding scale Novolog Y/N  If Yes, do you have a baseline novolog pre-meal dose:  units with meals  Patients eat three meals a day:   Y/N    How many episodes of hypoglycemia do you have per week: _______  How many missed doses do you have per week: ______  How many times do you check your blood glucose per day: _______   Any Barriers to therapy - Be specific :  cost of medications, comfortable with giving injections (if applicable), comfortable and confident with current diabetes regimen: Y/N ______________      Prior to Admission medications    Medication Sig Last Dose Taking? Auth Provider   AMLODIPINE BESYLATE PO Take 10 mg by mouth daily  3/18/2018 at AM Yes Reported, Patient   LISINOPRIL PO Take 10 mg by mouth daily  3/18/2018 at AM Yes Reported, Patient   Multiple Vitamins-Minerals (PRESERVISION AREDS) CAPS Take 1 tablet by mouth 2 times daily  3/18/2018 at AM Yes Reported, Patient   VITAMIN D, CHOLECALCIFEROL, PO Take 1,000 Units by mouth daily  3/18/2018 at AM Yes Reported, Patient   SIMVASTATIN PO Take 10 mg by mouth At Bedtime  3/17/2018 at PM Yes  Reported, Patient   ASPIRIN PO Take 81 mg by mouth daily  3/17/2018 at PM Yes Reported, Patient

## 2018-03-18 NOTE — ED PROVIDER NOTES
History     Chief Complaint:  Rectal bleed    HPI   Rebeca Izaguirre is a 89 year old female with history of hypertension who presents for evaluation of rectal bleeding. The patient states she noted some blood in her stools around 2000 last evening while having a bowel movement. She then proceeded to have multiple (at least 10) episodes of small diarrhea throughout the night with continued bright red blood with associated nausea. She had a she had [employer] fish sandwich last evening with her daughter who is not ill. The continued episodes prompted the patient to call her daughter to bring her here to the ED. Here, the patient complains of continued symptoms but denies lightheadedness, dizziness, vomiting, abdominal pain, additional bleeding, or any other symptoms. The patient lives alone in her own home. She has not had any recent hospitalization or recent courses of antibiotics. She notes her grandson did have C. Diff recently.     Allergies:  No known drug allergies     Medications:    Amlodipine besylate  Lisinopril   Multiple vitamins-minerals  Vitamin D  Simvastatin   Aspirin      Past Medical History:    Hypertension     Past Surgical History:    History reviewed. No pertinent surgical history.     Family History:    History reviewed. No pertinent family history.      Social History:  Smoking status: Never smoker  Alcohol use: No   Marital Status:        Review of Systems   Gastrointestinal: Positive for anal bleeding, blood in stool, diarrhea and nausea. Negative for vomiting.   Neurological: Negative for dizziness and light-headedness.   All other systems reviewed and are negative.    Physical Exam   First Vitals:   BP Temp Temp src Pulse Heart Rate SpO2   03/18/18 1045 134/70 - - 94 - 95 %   03/18/18 1030 133/70 - - - - 98 %   03/18/18 1015 144/70 - - - - 97 %   03/18/18 1003 154/70 98.4  F (36.9  C) Oral 102 102 96 %      Physical Exam    Nursing note and vitals reviewed.    Constitutional:  Pleasant and well groomed.          HENT:    Mouth/Throat: Oropharynx is without swelling or erythema. Dry oral mucosa.    Eyes: Conjunctivae are normal. No scleral icterus.    Neck: Neck supple.   Cardiovascular: Normal rate, regular rhythm and intact distal pulses.    Pulmonary/Chest: Effort normal and breath sounds normal.   Abdominal: Soft.  No distension. There is no tenderness.   Rectal: no anal fissures, no active external hemorrhoid, no active bleeding.   Musculoskeletal:  No edema, No calf tenderness  Neurological:Alert. Coordination normal. Tremor (baseline and worsened per patient when anxious)  Skin: Skin is warm and dry.   Psychiatric: Normal mood and mildly anxious affect.     Emergency Department Course   Imaging:  Radiographic findings were communicated with the patient who voiced understanding of the findings.    CT-scan abdomen pelvis w contrast:  1. Marked descending and sigmoid colonic diverticulosis.  2. Diffuse wall thickening of the descending colon worrisome for  nonspecific colitis.  3. Nonspecific trace peritoneal fluid in the pelvis.  As read by Radiology.      Laboratory:  CBC: WBC 20.0(H), o/w WNL (HGB 12.6, )   CMP: sodium 131(L), glucose 207(H), o/w WNL (Creatinine 0.87)  1010 INR: 10.6   Occult blood stool: pending    Stool culture: pending  Clostridium difficile toxin B PCR: pending   ABO/Rh type and screen: O positive      Interventions:  1019 Protonix 40 mg IV  1019 NS 1L IV Bolus  1019 Zofran 4mg IV injection        Emergency Department Course:  Past medical records, nursing notes, and vitals reviewed.  1003: I performed an exam of the patient and obtained history, as documented above.   IV started, labs were drawn, and interventions given as above.    Patient had one episode of loose stool in the ED with observed blood.     The patient was sent for a CT scan while in the emergency department, findings above.   Findings and plan explained to the Patient who consents to  admission.   1116: Discussed the patient with Dr. Roger of [facility] services, who will admit the patient to a medical bed for further monitoring, evaluation, and treatment.     Impression & Plan    Medical Decision Making:  Rebeca Izaguirre is a 89 year old female presents with bloody diarrhea that started last night. Differential diagnosis is broad including, but not limited to, infectious colitis, ischemic colitis, diverticular bleed, malignancy, or upper GI bleed. ED evaluation is as noted above. She has a stable hemoglobin and elevated white blood cell count which supports an infectious cause. CT scan reveals colitis but is otherwise not concerning. We have missed stool specimens due to urine contamination. She's remained stable during her time in the ED and continuous to have a benign abdominal exam. Dr. Roger has accepted the patient for admission for ongoing evaluation, monitoring, and management.     Diagnosis:    ICD-10-CM    1. Hematochezia K92.1      Disposition:  Admitted to medical bed    Tony Ag  3/18/2018   [facility] EMERGENCY DEPARTMENT    I, Tony Ag, am serving as a scribe at 10:00 AM on 3/18/2018 to document services personally performed by Mary Morfin MD based on my observations and the provider's statements to me.       Mary Morfin MD  03/18/18 1519

## 2018-03-18 NOTE — IP AVS SNAPSHOT
MRN:[id number]                      After Visit Summary   3/18/2018    Rebeca Izaguirre    MRN: [id number]           Thank you!     Thank you for choosing [facility] for your care. Our goal is always to provide you with excellent care. Hearing back from our patients is one way we can continue to improve our services. Please take a few minutes to complete the written survey that you may receive in the mail after you visit. If you would like to speak to someone directly about your visit please contact Patient Relations at [phone]. Thank you!          Patient Information     Date Of Birth          1/28/1928        Designated Caregiver       Most Recent Value    Caregiver    Will someone help with your care after discharge? no      About your hospital stay     You were admitted on:  March 18, 2018 You last received care in the:  [facility]    You were discharged on:  March 21, 2018        Reason for your hospital stay       Colitis                  Who to Call     For medical emergencies, please call 911.  For non-urgent questions about your medical care, please call your primary care provider or clinic, [phone]          Attending Provider     Provider Specialty    Mary Morfin MD Emergency Medicine    [facility]       Primary Care Provider Office Phone # Fax #    Alexa Prado [phone]      After Care Instructions     Diet       Follow this diet upon discharge:     Low Fiber Diet for one week, then advance to regular as tolerated                  Follow-up Appointments     Follow-up and recommended labs and tests        Follow up with primary care provider, Alexa Prado, in 1-2 weeks for hospital follow- up.                  Further instructions from your care team       Continue to follow a low fiber diet for the next few days to a week.    Pending Results     No orders found from 3/16/2018 to  "3/19/2018.            Statement of Approval     Ordered          18 1139  I have reviewed and agree with all the recommendations and orders detailed in this document.  EFFECTIVE NOW     Approved and electronically signed by:  Latrell Lozano MD             Admission Information     Date & Time Provider Department Dept. Phone    3/18/2018 Chivo Roger MD [facility] [phone]      Your Vitals Were     Blood Pressure Pulse Temperature Respirations Height Weight    144/64 65 97.4  F (36.3  C) (Oral) 16 1.524 m (5') 54.1 kg (119 lb 4.8 oz)    Pulse Oximetry BMI (Body Mass Index)                99% 23.3 kg/m2          MyChart Information     [employer] lets you send messages to your doctor, view your test results, renew your prescriptions, schedule appointments and more. To sign up, go to [email or web address] . Click on \"Log in\" on the left side of the screen, which will take you to the Welcome page. Then click on \"Sign up Now\" on the right side of the page.     You will be asked to enter the access code listed below, as well as some personal information. Please follow the directions to create your username and password.     Your access code is: [email or web address]  Expires: 2018 11:11 AM     Your access code will  in 90 days. If you need help or a new code, please call your [place] clinic or [phone].        Care EveryWhere ID     This is your Care EveryWhere ID. This could be used by other organizations to access your [place] medical records  [id number]        Equal Access to Services     [facility]: Hadii jayla Barba, waaxda luqadaha, qaybta chasaljoe culver. So [facility] [phone].    ATENCIÓN: Si habla español, tiene a thao disposición servicios gratuitos de asistencia lingüística. Llame al [phone].    We comply with applicable federal civil rights laws and [place] laws. We do not discriminate on the " basis of race, color, national origin, age, disability, sex, sexual orientation, or gender identity.               Review of your medicines      CONTINUE these medicines which have NOT CHANGED        Dose / Directions    AMLODIPINE BESYLATE PO        Dose:  10 mg   Take 10 mg by mouth daily   Refills:  0       ASPIRIN PO        Dose:  81 mg   Take 81 mg by mouth daily   Refills:  0       LISINOPRIL PO        Dose:  10 mg   Take 10 mg by mouth daily   Refills:  0       PRESERVISION AREDS Caps        Dose:  1 tablet   Take 1 tablet by mouth 2 times daily   Refills:  0       SIMVASTATIN PO        Dose:  10 mg   Take 10 mg by mouth At Bedtime   Refills:  0       VITAMIN D (CHOLECALCIFEROL) PO        Dose:  1000 Units   Take 1,000 Units by mouth daily   Refills:  0                Protect others around you: Learn how to safely use, store and throw away your medicines at www.disposemymeds.org.             Medication List: This is a list of all your medications and when to take them. Check marks below indicate your daily home schedule. Keep this list as a reference.      Medications           Morning Afternoon Evening Bedtime As Needed    AMLODIPINE BESYLATE PO   Take 10 mg by mouth daily   Last time this was given:  10 mg on 3/21/2018  8:58 AM                                   ASPIRIN PO   Take 81 mg by mouth daily                                   LISINOPRIL PO   Take 10 mg by mouth daily   Last time this was given:  10 mg on 3/21/2018  8:58 AM                                   PRESERVISION AREDS Caps   Take 1 tablet by mouth 2 times daily                                SIMVASTATIN PO   Take 10 mg by mouth At Bedtime                                   VITAMIN D (CHOLECALCIFEROL) PO   Take 1,000 Units by mouth daily

## 2018-03-18 NOTE — ED NOTES
[facility]  ED Nurse Handoff Report    Rebeca Izaguirre is a 89 year old female   ED Chief complaint: No chief complaint on file.  . ED Diagnosis:   Final diagnoses:   Hematochezia     Allergies: No Known Allergies    Code Status: Full Code  Activity level - Baseline/Home:  Independent. Activity Level - Current:   Stand with Assist. Lift room needed: No. Bariatric: No   Needed: No   Isolation: Yes. Infection: Not Applicable  C-Diff Pending.     Vital Signs:   Vitals:    03/18/18 1100 03/18/18 1115 03/18/18 1130 03/18/18 1145   BP: (!) 140/125 139/73 148/72 143/64   Pulse:       Temp:       TempSrc:       SpO2: 98% 99% 97% 96%       Cardiac Rhythm:  ,      Pain level:    Patient confused: No. Patient Falls Risk: Yes.   Elimination Status: Has voided   Patient Report - Initial Complaint: rectal bleed. Focused Assessment: A&O. Reports bright red stools since last night around 8 PM as well as loose stools. 1 red, loose stool since arrival, unable to collect sample at that time. Denies dizziness, lightheadedness. Denies abdominal pain, reports feeling nauseated. Zofran given with relief.   Tests Performed: labs, stool study, CT Abd. Abnormal Results:   CT Abdomen Pelvis w Contrast   Preliminary Result   IMPRESSION:   1. Marked descending and sigmoid colonic diverticulosis.   2. Diffuse wall thickening of the descending colon worrisome for   nonspecific colitis.   3. Nonspecific trace peritoneal fluid in the pelvis.        Labs Ordered and Resulted from Time of ED Arrival Up to the Time of Departure from the ED   CBC WITH PLATELETS DIFFERENTIAL - Abnormal; Notable for the following:        Result Value    WBC 20.0 (*)     Absolute Neutrophil 17.8 (*)     All other components within normal limits   COMPREHENSIVE METABOLIC PANEL - Abnormal; Notable for the following:     Sodium 131 (*)     Glucose 207 (*)     All other components within normal limits   INR   OCCULT BLOOD STOOL   PULSE OXIMETRY  NURSING   CARDIAC CONTINUOUS MONITORING   PERIPHERAL IV CATHETER   FREE WATER   ABO/RH TYPE AND SCREEN   ENTERIC BACTERIA AND VIRUS PANEL BY XIOMARA STOOL   CLOSTRIDIUM DIFFICILE TOXIN B     .   Treatments provided: Antimetics, IVF, protonix  Family Comments: Daughtersheeba at bedsid  OBS brochure/video discussed/provided to patient:  N/A  ED Medications:   Medications   lidocaine 1 % 1 mL (not administered)   lidocaine (LMX4) kit (not administered)   sodium chloride (PF) 0.9% PF flush 3 mL (not administered)   sodium chloride (PF) 0.9% PF flush 3 mL (not administered)   0.9% sodium chloride BOLUS (500 mLs Intravenous New Bag 3/18/18 1019)     Followed by   sodium chloride 0.9% infusion (not administered)   pantoprazole (PROTONIX) 40 mg IV push injection (40 mg Intravenous Given 3/18/18 1019)   ondansetron (ZOFRAN) injection 4 mg (4 mg Intravenous Given 3/18/18 1019)   iopamidol (ISOVUE-370) solution 500 mL (61 mLs Intravenous Given 3/18/18 1204)   0.9% sodium chloride BOLUS (0 mLs Intravenous Stopped 3/18/18 1213)     Drips infusing:  No  For the majority of the shift, the patient's behavior Green. Interventions performed were NA.     Severe Sepsis OR Septic Shock Diagnosis Present: No      ED Nurse Name/Phone Number: Krystal Abreu,   12:31 PM    RECEIVING UNIT ED HANDOFF REVIEW    Above ED Nurse Handoff Report was reviewed: Yes  Reviewed by: Pau Jose on March 18, 2018 at 3:57 PM

## 2018-03-19 LAB
ANION GAP SERPL CALCULATED.3IONS-SCNC: 6 MMOL/L (ref 3–14)
BUN SERPL-MCNC: 20 MG/DL (ref 7–30)
C COLI+JEJUNI+LARI FUSA STL QL NAA+PROBE: NOT DETECTED
CALCIUM SERPL-MCNC: 8.6 MG/DL (ref 8.5–10.1)
CHLORIDE SERPL-SCNC: 110 MMOL/L (ref 94–109)
CO2 SERPL-SCNC: 25 MMOL/L (ref 20–32)
CREAT SERPL-MCNC: 0.77 MG/DL (ref 0.52–1.04)
EC STX1 GENE STL QL NAA+PROBE: NOT DETECTED
EC STX2 GENE STL QL NAA+PROBE: NOT DETECTED
ENTERIC PATHOGEN COMMENT: NORMAL
ERYTHROCYTE [DISTWIDTH] IN BLOOD BY AUTOMATED COUNT: 13.6 % (ref 10–15)
GFR SERPL CREATININE-BSD FRML MDRD: 71 ML/MIN/1.7M2
GLUCOSE BLDC GLUCOMTR-MCNC: 130 MG/DL (ref 70–99)
GLUCOSE BLDC GLUCOMTR-MCNC: 136 MG/DL (ref 70–99)
GLUCOSE BLDC GLUCOMTR-MCNC: 140 MG/DL (ref 70–99)
GLUCOSE BLDC GLUCOMTR-MCNC: 143 MG/DL (ref 70–99)
GLUCOSE BLDC GLUCOMTR-MCNC: 173 MG/DL (ref 70–99)
GLUCOSE SERPL-MCNC: 145 MG/DL (ref 70–99)
HCT VFR BLD AUTO: 38.3 % (ref 35–47)
HGB BLD-MCNC: 11.5 G/DL (ref 11.7–15.7)
HGB BLD-MCNC: 12.1 G/DL (ref 11.7–15.7)
MCH RBC QN AUTO: 27.6 PG (ref 26.5–33)
MCHC RBC AUTO-ENTMCNC: 31.6 G/DL (ref 31.5–36.5)
MCV RBC AUTO: 87 FL (ref 78–100)
NOROV GI+II ORF1-ORF2 JNC STL QL NAA+PR: NOT DETECTED
PLATELET # BLD AUTO: 268 10E9/L (ref 150–450)
POTASSIUM SERPL-SCNC: 4.1 MMOL/L (ref 3.4–5.3)
RBC # BLD AUTO: 4.39 10E12/L (ref 3.8–5.2)
RVA NSP5 STL QL NAA+PROBE: NOT DETECTED
SALMONELLA SP RPOD STL QL NAA+PROBE: NOT DETECTED
SHIGELLA SP+EIEC IPAH STL QL NAA+PROBE: NOT DETECTED
SODIUM SERPL-SCNC: 141 MMOL/L (ref 133–144)
V CHOL+PARA RFBL+TRKH+TNAA STL QL NAA+PR: NOT DETECTED
WBC # BLD AUTO: 17.6 10E9/L (ref 4–11)
Y ENTERO RECN STL QL NAA+PROBE: NOT DETECTED

## 2018-03-19 PROCEDURE — 36415 COLL VENOUS BLD VENIPUNCTURE: CPT | Performed by: INTERNAL MEDICINE

## 2018-03-19 PROCEDURE — 80048 BASIC METABOLIC PNL TOTAL CA: CPT | Performed by: INTERNAL MEDICINE

## 2018-03-19 PROCEDURE — 99232 SBSQ HOSP IP/OBS MODERATE 35: CPT | Performed by: INTERNAL MEDICINE

## 2018-03-19 PROCEDURE — 00000146 ZZHCL STATISTIC GLUCOSE BY METER IP

## 2018-03-19 PROCEDURE — 12000000 ZZH R&B MED SURG/OB

## 2018-03-19 PROCEDURE — 85018 HEMOGLOBIN: CPT | Performed by: INTERNAL MEDICINE

## 2018-03-19 PROCEDURE — 85027 COMPLETE CBC AUTOMATED: CPT | Performed by: INTERNAL MEDICINE

## 2018-03-19 PROCEDURE — 25800025 ZZH RX 258: Performed by: INTERNAL MEDICINE

## 2018-03-19 RX ADMIN — POTASSIUM CHLORIDE, DEXTROSE MONOHYDRATE AND SODIUM CHLORIDE: 150; 5; 900 INJECTION, SOLUTION INTRAVENOUS at 14:33

## 2018-03-19 NOTE — PLAN OF CARE
Problem: Patient Care Overview  Goal: Plan of Care/Patient Progress Review  VS /53  Pulse 85  Temp 98  F (36.7  C) (Oral)  Resp 16  Ht 1.524 m (5')  Wt 54.1 kg (119 lb 4.8 oz)  SpO2 96%  BMI 23.3 kg/m2   ED admit to the floor at 1630. A&Ox4, up SBA. Lives independently at baseline. VSS, on room air. Denies pain. NPO, can have ice chips and water until midnight. D50.9NaClK+ 50/hr.  and 157. Voiding w/o difficulty. Had 2 loose/soft stool with small amount blood. On enteric iso-C-Diff negative, enteric panel in process. Plan- GI consult in am.

## 2018-03-19 NOTE — CONSULTS
GASTROENTEROLOGY CONSULTATION      Rebeca Izaguirre  [address] [place] [address] [place] [id number]  89 year old female     Admission Date/Time: 3/18/2018  Primary Care Provider: No Ref-Primary, Physician  Referring / Attending Physician:  Dr. Roger     We were asked to see the patient in consultation by Dr. Roger for evaluation of hematochezia.        HPI:  Rebeca Izaguirre is a 89 year old female with medical history of hypertension, and type 2 diabetes who presents to the hospital with hematochezia.    Patient reports that on Saturday afternoon while shopping with her mother she passed loose stool mixed with red blood.  She mentions some passing nausea without any abdominal pain.  She that she was feeling better until she got home and continued to have episodes of hematochezia.  She was up most of Saturday evening in the bathroom.  This morning they decided they should come to the emergency room for evaluation.  There is no vomiting, fever or chills.  She has been eating and drinking normally prior to a few days ago.  She has been maintaining a stable weight.  She denies any consumption of suspicious foods.  No recent antibiotic use.  She denies any use of NSAIDs.  She uses both amlodipine and lisinopril for hypertension.    The patient takes aspirin 81 mg daily.  She has never had a colonoscopy before.  She mentions a flexible sigmoidoscopy was done at one point for screening purposes.  No significant family history of colon cancer that she knows.  The patient lives independently in a single family home and largely cares for herself.       PAST MEDICAL HISTORY:  Patient Active Problem List    Diagnosis Date Noted     GI bleeding 03/18/2018     Priority: Medium          ROS: A comprehensive ten point review of systems was negative aside from those in mentioned in the HPI.       MEDICATIONS:   Prior to Admission medications    Medication Sig Start Date End Date Taking? Authorizing Provider   AMLODIPINE BESYLATE  PO Take 10 mg by mouth daily    Yes Reported, Patient   LISINOPRIL PO Take 10 mg by mouth daily    Yes Reported, Patient   Multiple Vitamins-Minerals (PRESERVISION AREDS) CAPS Take 1 tablet by mouth 2 times daily    Yes Reported, Patient   VITAMIN D, CHOLECALCIFEROL, PO Take 1,000 Units by mouth daily    Yes Reported, Patient   SIMVASTATIN PO Take 10 mg by mouth At Bedtime    Yes Reported, Patient   ASPIRIN PO Take 81 mg by mouth daily    Yes Reported, Patient        ALLERGIES: No Known Allergies     SOCIAL HISTORY:  Social History   Substance Use Topics     Smoking status: Never Smoker     Smokeless tobacco: Never Used     Alcohol use No        FAMILY HISTORY: Noncontributory.       PHYSICAL EXAM:     /49  Pulse 85  Temp 98.6  F (37  C) (Oral)  Resp 16  Ht 1.524 m (5')  Wt 54.1 kg (119 lb 4.8 oz)  SpO2 97%  BMI 23.3 kg/m2     PHYSICAL EXAM:  GENERAL: No distress, resting comfortably in bedside chair  SKIN: no suspicious lesions, rashes  HEAD: Normocephalic. Atraumatic.  NECK: Neck supple. No adenopathy.   EYES: No scleral icterus  RESPIRATORY: Good transmission. CTA bilaterally.   CARDIOVASCULAR: RRR, normal S1, S2,  No murmur appreciated  GASTROINTESTINAL: +BS, soft, non tender, non distended, no guarding/rebound  JOINT/EXTREMITIES:  no gross deformities noted, normal muscle tone  NEURO: CN 2-12 grossly intact, no focal deficits  PSYCH: Normal affect              ADDITIONAL COMMENTS:   I reviewed the patient's new clinical lab test results.   Recent Labs   Lab Test  03/19/18   0750  03/19/18   0044  03/18/18   1826  03/18/18   1010  11/06/17   1134   WBC  17.6*   --    --   20.0*  11.1*   HGB  12.1  11.5*  12.6  12.6  13.0   MCV  87   --    --   82  86   PLT  268   --    --   296  275   INR   --    --    --   1.06   --      Recent Labs   Lab Test  03/19/18   0750  03/18/18   1010  11/06/17   1134   POTASSIUM  4.1  3.9  4.0   CHLORIDE  110*  98  102   CO2  25  25  28   BUN  20  28  30   ANIONGAP  6  8   7     Recent Labs   Lab Test  03/18/18   1010  11/06/17   1230   ALBUMIN  3.9   --    BILITOTAL  0.8   --    ALT  28   --    AST  20   --    PROTEIN   --   Negative        IMAGING / ENDOSCOPY     CT ABDOMEN AND PELVIS WITH CONTRAST 3/18/2018 12:24 PM      FINDINGS:  Cholecystectomy surgical clips are noted. Prominent  descending and sigmoid colonic diverticulosis is noted. Diffuse  thickening of the descending colonic wall may represent superimposed  nonspecific colitis. There is a normal-appearing appendix. No evidence  of bowel obstruction. Trace peritoneal fluid is noted within the  pelvis. No free peritoneal air. The liver, spleen, kidneys, and  adrenal glands appear within normal limits. The pancreas appears to be  atrophied.         IMPRESSION:  1. Marked descending and sigmoid colonic diverticulosis.  2. Diffuse wall thickening of the descending colon worrisome for  nonspecific colitis.  3. Nonspecific trace peritoneal fluid in the pelvis.     CONSULTATION ASSESSMENT AND PLAN:    Rebeca Izaguirre is a 89 year old female with medical history of hypertension and type 2 diabetes presenting with hematochezia found to have a stable hemoglobin at 12 with CT evidence of left-sided colitis.    1.  Hematochezia: Most likely this represents ischemic colitis.  The left-sided distribution would support this as well.  C. Difficile and enteric panel are negative.  Her hemoglobin is stable.  She does have some leukocytosis without fever or chills.   -- Self-limited and will resolve spontaneously.  Clinically improving with decreasing frequency.  -- With advanced diet to clears, then advance as tolerated.  No procedure planned at this time.      I discussed the patient plan with Dr. Neely. Thank you for asking us to participate in the care of this patient.    Kerline Pastrana PA-C  [facility]

## 2018-03-19 NOTE — CONSULTS
CTS consulted as pt has no PCP. Met w/ pt and dtr at bedside, pt [facility] and she has not re-established w/ a new PCP. Pt expresses interest in seeing Dr. Monet Swenson at [facility], attempted to schedule however it appears that Dr. Swenson is out of the office until late May. Pt states [facility] and then [facility] care to Dr. Swenson when she is due for her annual exam in November 2018. Declines assistance w/ scheduling.     Pt lives alone in her home and is independent w/ ambulating and ADLs. She does her own laundry, cooking and cleaning. She does not drive and relies on family for transport to appointments and errands. She sets up her own medication and denies issue or concern w/ this. She has a shower chair, grab bars, cane and walker available at her home if needed. Pt denies having any other d/c needs at this time.     CM will continue to follow patient for any additional discharge needs.     Tessie Garcia RN BSN CTS   [phone]  Care Transitions Team  [facility]

## 2018-03-19 NOTE — PROGRESS NOTES
[facility]  Hospitalist Progress Note  Name: Rebeca Izaguirre    MRN: [id number]  YOB: 1928    Age: 89 year old  Date of admission: 3/18/2018  Primary care provider: No Ref-Primary, Physician      Reason for Stay (Diagnosis): Hematochezia/abdominal pain secondary to ischemic colitis         Assessment and Plan:      Summary of Stay: Patient is a very pleasant 89-year-old female who is cognitively intact, independent and functional with a history of hypertension, type 2 diabetes diet-controlled, and hypercholesterolemia who presents here with hematochezia and some abdominal pain.  Workup in the ED with the CT of the abdomen demonstrates diffuse diverticulosis along with descending colon wall thickening consistent with colitis.    Problem List/Plan:  1. Acute colitis: Discussed with GI.  Suspect ischemic and mild in nature.  Conservative cares.  Pain is otherwise controlled.  Started on clears and could be advanced tomorrow if okay with GI.  2. Hematochezia: Despite bloody stools, hemoglobin is otherwise stable.  Continue to monitor for now.  3. Hypertension: Controlled at this time.  Resume lisinopril and amlodipine.  4. Type 2 diabetes, diet controlled: Continue medium tensing sliding scale while inpatient.      DVT Prophylaxis: Pneumatic Compression Devices  Code Status: Full Code  Discharge Dispo: home  Estimated Disch Date / # of Days until Disch: In 1-2 days if hemoglobin is stable and able to advance diet.        Interval History (Subjective):      Denies any abdominal pain.  No bowel movement today.         Physical Exam:      Vital signs:  Temp: 97.5  F (36.4  C) Temp src: Oral BP: 129/55   Heart Rate: 73 Resp: 16 SpO2: 96 % O2 Device: None (Room air)   Height: 152.4 cm (5') Weight: 54.1 kg (119 lb 4.8 oz)  Estimated body mass index is 23.3 kg/(m^2) as calculated from the following:    Height as of this encounter: 1.524 m (5').    Weight as of this encounter: 54.1 kg  (119 lb 4.8 oz).    # Pain Assessment:   Current Pain Score 3/19/2018 3/19/2018 11/6/2017   Patient currently in pain? denies denies -   Pain score (0-10) - - 0   Rebeca oneill pain level was assessed and she currently denies pain.  As needed Tylenol      I/O last 3 completed shifts:  In: 1446 [P.O.:490; I.V.:956]  Out: -   Vitals:    03/18/18 1624   Weight: 54.1 kg (119 lb 4.8 oz)       Constitutional: Awake, alert, cooperative, no apparent distress   Respiratory: Nl work of breathing. Clear to auscultation bilaterally, no crackles or wheezing   Cardiovascular: Regular rate and rhythm, normal S1 and S2, and no murmur noted   Abdomen: Normal bowel sounds, soft, non-distended, non-tender   Skin: No rashes, no cyanosis, dry to touch   Neuro: CN 2-12 intact, no localizing weakness   Extremities: No edema, normal range of motion   HEENT Normocephalic, atraumatic, normal nasal turbinates; oropharynx clear   Neck Supple; nl inspection; trachea midline; no thryomegaly   Psychiatric: A+O x3. Normal affect          Medications:      All current medications were reviewed with changes reflected in problem list.         Data:      All new lab and imaging data was reviewed.   Labs:    Recent Labs  Lab 03/19/18  0750 03/19/18  0044 03/18/18  1826 03/18/18  1010   WBC 17.6*  --   --  20.0*   HGB 12.1 11.5* 12.6 12.6   HCT 38.3  --   --  37.9   MCV 87  --   --  82     --   --  296       Recent Labs  Lab 03/19/18  0750 03/18/18  1010    131*   POTASSIUM 4.1 3.9   CHLORIDE 110* 98   CO2 25 25   ANIONGAP 6 8   * 207*   BUN 20 28   CR 0.77 0.87   GFRESTIMATED 71 61   GFRESTBLACK 85 74   DESTIN 8.6 8.9   PROTTOTAL  --  7.9   ALBUMIN  --  3.9   BILITOTAL  --  0.8   ALKPHOS  --  79   AST  --  20   ALT  --  28      Imaging:   No results found for this or any previous visit (from the past 24 hour(s)).    Jean-Claude Dasilva -[phone]

## 2018-03-19 NOTE — PLAN OF CARE
Problem: Patient Care Overview  Goal: Individualization & Mutuality  Outcome: Improving  Pt A&O. VS stable; afebrile. Held Amlodipine and Lisinopril this morning d/t order parameters not being met. Denies pain. Up w/ SBA. Voiding in good amounts. Negative for C diff, off enteric precautions. Pt still experiencing some bloody loose stools, but not as frequent as prior; improving. Hypoactive bowel sounds; Pt advanced from clears to full liquid. Tolerating well. Will continue to monitor.

## 2018-03-20 LAB
ERYTHROCYTE [DISTWIDTH] IN BLOOD BY AUTOMATED COUNT: 13.4 % (ref 10–15)
GLUCOSE BLDC GLUCOMTR-MCNC: 129 MG/DL (ref 70–99)
GLUCOSE BLDC GLUCOMTR-MCNC: 162 MG/DL (ref 70–99)
GLUCOSE BLDC GLUCOMTR-MCNC: 196 MG/DL (ref 70–99)
GLUCOSE BLDC GLUCOMTR-MCNC: 204 MG/DL (ref 70–99)
HCT VFR BLD AUTO: 35 % (ref 35–47)
HGB BLD-MCNC: 11.2 G/DL (ref 11.7–15.7)
MCH RBC QN AUTO: 26.8 PG (ref 26.5–33)
MCHC RBC AUTO-ENTMCNC: 32 G/DL (ref 31.5–36.5)
MCV RBC AUTO: 84 FL (ref 78–100)
PLATELET # BLD AUTO: 235 10E9/L (ref 150–450)
RBC # BLD AUTO: 4.18 10E12/L (ref 3.8–5.2)
WBC # BLD AUTO: 9.7 10E9/L (ref 4–11)

## 2018-03-20 PROCEDURE — 36415 COLL VENOUS BLD VENIPUNCTURE: CPT | Performed by: INTERNAL MEDICINE

## 2018-03-20 PROCEDURE — 12000000 ZZH R&B MED SURG/OB

## 2018-03-20 PROCEDURE — 00000146 ZZHCL STATISTIC GLUCOSE BY METER IP

## 2018-03-20 PROCEDURE — 99232 SBSQ HOSP IP/OBS MODERATE 35: CPT | Performed by: INTERNAL MEDICINE

## 2018-03-20 PROCEDURE — 25800025 ZZH RX 258: Performed by: INTERNAL MEDICINE

## 2018-03-20 PROCEDURE — 85027 COMPLETE CBC AUTOMATED: CPT | Performed by: INTERNAL MEDICINE

## 2018-03-20 RX ADMIN — POTASSIUM CHLORIDE, DEXTROSE MONOHYDRATE AND SODIUM CHLORIDE: 150; 5; 900 INJECTION, SOLUTION INTRAVENOUS at 10:37

## 2018-03-20 NOTE — PROGRESS NOTES
[facility]  Hospitalist Progress Note    Date of Service (when I saw the patient): 03/20/2018    Assessment & Plan    This is a very pleasant 89-year-old female who is cognitively intact, independent and functional with a history of hypertension, type 2 diabetes diet-controlled, and hypercholesterolemia who presented with hematochezia and some abdominal pain.  Workup in the ED with the CT of the abdomen demonstrates diffuse diverticulosis along with descending colon wall thickening consistent with colitis.    Problem List/Plan:  1. Acute colitis: Patient was seen by GI, felt to be mild ischemic colitis. Conservative cares.  Not much pain issues. Tolerating liquid diet. Now advance to low residue diet. GI discuss with pt and family about outpatient colonoscopy to r/o malignancy and they declined   2. Hematochezia: Due to #1. Despite bloody stools, hemoglobin is relatively stable.  Continue to monitor for now.  3. Hypertension: Controlled at this time.  Resume lisinopril and amlodipine.  4.   Type 2 diabetes, diet controlled: Stable, monitor, stop SSI     DVT Prophylaxis: Pneumatic Compression Devices  Code Status: Full Code  Discharge Dispo: home  Estimated Disch Date / # of Days until Disch: Home tomorrow if tolerating diet and stools better     Tauseef Ur Blake    Interval History   Chart reviewed and patient seen. Case discussed with nursing staff.     Patient is doing well, reports having a 'tinted' BM, with possibly small amount of blood, taking full liquid diet, Denies any chest pain or shortness of breath. No nausea, vomiting. No abdominal pain. No other complaints voiced.     -Data reviewed today: I reviewed all new labs and imaging results over the last 24 hours. I personally reviewed   .  # Pain Assessment:   Current Pain Score 3/20/2018 3/19/2018 3/19/2018   Patient currently in pain? denies denies denies   Pain score (0-10) - - -   Rebeca oneill pain level was assessed and she currently denies  pain.         Physical Exam   Temp: 97  F (36.1  C) Temp src: Oral BP: 116/50 Pulse: 65 Heart Rate: 83 Resp: 16 SpO2: 97 % O2 Device: None (Room air)    Vitals:    03/18/18 1624   Weight: 54.1 kg (119 lb 4.8 oz)     Vital Signs with Ranges  Temp:  [96.6  F (35.9  C)-97.5  F (36.4  C)] 97  F (36.1  C)  Pulse:  [65] 65  Heart Rate:  [65-83] 83  Resp:  [16] 16  BP: (112-134)/(50-65) 116/50  SpO2:  [96 %-97 %] 97 %  I/O last 3 completed shifts:  In: 1934 [P.O.:780; I.V.:1154]  Out: -       Constitutional: Awake, alert, cooperative, no apparent distress   Respiratory: Clear to auscultation bilaterally, no crackles or wheezing noted, symmetric chest rise bilaterally   Cardiovascular: Regular rate and rhythm, normal S1 and S2, no rubs or gallops noted   Abdomen: Bowel sounds present, soft, non-distended, non-tender, no rebound or guarding is noted   Skin: No exanthems noted on exposed areas, no cy anosis, dry to touch   Neuro: Alert and oriented x3, no focal weakness or numbness   Extremities: No pitting edema, normal range of motion, skin is warm to touch with signs of adequate peripheral perfusion    Psychiatric: Calm, not agitated, no active hallucinations             Medications     dextrose 5% and 0.9% NaCl with potassium chloride 20 mEq 50 mL/hr at 03/20/18 1037       amLODIPine (NORVASC) tablet 10 mg  10 mg Oral Daily     lisinopril (PRINIVIL/ZESTRIL) tablet 10 mg  10 mg Oral Daily     insulin aspart  1-7 Units Subcutaneous TID AC     insulin aspart  1-5 Units Subcutaneous At Bedtime       Data   All new lab data and imaging results from today have been reviewed       Recent Labs  Lab 03/20/18  0615 03/19/18  0750 03/19/18  0044  03/18/18  1010   WBC 9.7 17.6*  --   --  20.0*   HGB 11.2* 12.1 11.5*  < > 12.6   MCV 84 87  --   --  82    268  --   --  296   INR  --   --   --   --  1.06   NA  --  141  --   --  131*   POTASSIUM  --  4.1  --   --  3.9   CHLORIDE  --  110*  --   --  98   CO2  --  25  --   --  25    BUN  --  20  --   --  28   CR  --  0.77  --   --  0.87   ANIONGAP  --  6  --   --  8   DESTIN  --  8.6  --   --  8.9   GLC  --  145*  --   --  207*   ALBUMIN  --   --   --   --  3.9   PROTTOTAL  --   --   --   --  7.9   BILITOTAL  --   --   --   --  0.8   ALKPHOS  --   --   --   --  79   ALT  --   --   --   --  28   AST  --   --   --   --  20   < > = values in this interval not displayed.    No results found for this or any previous visit (from the past 24 hour(s)).

## 2018-03-20 NOTE — CONSULTS
"Care Transitions Team: Following for CC, discharge planning, and disposition.      Per chart review pt identified as having no PCP.   Met with pt at bedside.   Pt reports that her PCP is [facility], \" my doctor actually just left\"  \" I do see one of their Physician assistants, I do not remember her name but I plan on following up with her next week.\"     Pt lives alone independently is very active and independent in her day to day life, dtr is support, no needs identified.     No PCP handoff indicated.     Saskia Gross RN   Care Transitions Team  [phone]'          "

## 2018-03-20 NOTE — PLAN OF CARE
Problem: Patient Care Overview  Goal: Plan of Care/Patient Progress Review  Outcome: Improving  A&O. VSS. Patient states she had a small amount of blood in stool but flushed before nurse could see it. Voiding adequately. SBA

## 2018-03-20 NOTE — PLAN OF CARE
Problem: Patient Care Overview  Goal: Plan of Care/Patient Progress Review  Outcome: Improving  No c/o pain. urth full liquid diet in small amts. Feeling of fullness after eating. Decreased stooling- clear mucous like with no visible blood. Up in chair most of shift. Blood sugars 136 & 143

## 2018-03-20 NOTE — PROGRESS NOTES
GASTROENTEROLOGY PROGRESS NOTE        SUBJECTIVE:  No abdominal pain. Passed brown stool last evening. Brown stool with trace red blood this morning.     OBJECTIVE:    /50 (BP Location: Left arm)  Pulse 65  Temp 97  F (36.1  C) (Oral)  Resp 16  Ht 1.524 m (5')  Wt 54.1 kg (119 lb 4.8 oz)  SpO2 97%  BMI 23.3 kg/m2  Temp (24hrs), Av.1  F (36.2  C), Min:96.6  F (35.9  C), Max:97.5  F (36.4  C)    Patient Vitals for the past 72 hrs:   Weight   18 1624 54.1 kg (119 lb 4.8 oz)       Intake/Output Summary (Last 24 hours) at 18 0942  Last data filed at 18 0800   Gross per 24 hour   Intake             2134 ml   Output                0 ml   Net             2134 ml        PHYSICAL EXAM     Constitutional: NAD  Cardiovascular: RRR, normal S1, S2, no murmur appreciated   Respiratory: good transmission, CTAB  Abdomen: soft, NTTP         Additional Comments:  ROS, FH, SH: See initial GI consult for details.     I have reviewed the patient's new clinical lab results:     Recent Labs   Lab Test  18   0615  18   0750  18   0044   18   1010   WBC  9.7  17.6*   --    --   20.0*   HGB  11.2*  12.1  11.5*   < >  12.6   MCV  84  87   --    --   82   PLT  235  268   --    --   296   INR   --    --    --    --   1.06    < > = values in this interval not displayed.     Recent Labs   Lab Test  18   0750  18   1010  17   1134   POTASSIUM  4.1  3.9  4.0   CHLORIDE  110*  98  102   CO2  25  25  28   BUN  20  28  30   ANIONGAP  6  8  7     Recent Labs   Lab Test  18   1010  17   1230   ALBUMIN  3.9   --    BILITOTAL  0.8   --    ALT  28   --    AST  20   --    PROTEIN   --   Negative        ASSESSMENT/ PLAN  Rebeca Izaguirre is a 89 year old female with medical history of hypertension and type 2 diabetes presenting with hematochezia found to have a stable hemoglobin at 12 with CT evidence of left-sided colitis.     1.  Hematochezia: Most likely this represents  ischemic colitis.  The left-sided distribution would support this as well.  C. Difficile and enteric panel are negative.  Her hemoglobin is stable.  No fever or chills.     -- Self-limited and will resolve spontaneously.  Clinically improving with decreasing frequency.  -- Advance as tolerated.   -- Standard recommendation would be to perform a colonoscopy as an outpatient, especially to rule out malignancy.  Given her lack of preceding symptoms, and CT findings, it is unlikely that she has colon cancer, but I can't say for sure unless a colonoscopy is performed. After discussion with her daughter, she determined she does not want a colonoscopy at this time.      Discussed with Dr. Neely. Myrna for DC from GI standpoint.  Kerline Pastrana PA-C  [facility]

## 2018-03-20 NOTE — PLAN OF CARE
Problem: Patient Care Overview  Goal: Plan of Care/Patient Progress Review  Outcome: Improving  Patient alert and oriented x4.  VSS.  Up w/ SBA to bathroom.  Diet advanced to low fiber.  Voiding well.  Insulin d/c'd.  Denies pain.  Plans to d/c home tomorrow.

## 2018-03-21 VITALS
BODY MASS INDEX: 23.42 KG/M2 | OXYGEN SATURATION: 99 % | DIASTOLIC BLOOD PRESSURE: 64 MMHG | TEMPERATURE: 97.4 F | HEIGHT: 60 IN | HEART RATE: 65 BPM | RESPIRATION RATE: 16 BRPM | SYSTOLIC BLOOD PRESSURE: 144 MMHG | WEIGHT: 119.3 LBS

## 2018-03-21 LAB — GLUCOSE BLDC GLUCOMTR-MCNC: 139 MG/DL (ref 70–99)

## 2018-03-21 PROCEDURE — 00000146 ZZHCL STATISTIC GLUCOSE BY METER IP

## 2018-03-21 PROCEDURE — A9270 NON-COVERED ITEM OR SERVICE: HCPCS | Mod: GY | Performed by: INTERNAL MEDICINE

## 2018-03-21 PROCEDURE — 25000132 ZZH RX MED GY IP 250 OP 250 PS 637: Mod: GY | Performed by: INTERNAL MEDICINE

## 2018-03-21 PROCEDURE — 99238 HOSP IP/OBS DSCHRG MGMT 30/<: CPT | Performed by: INTERNAL MEDICINE

## 2018-03-21 RX ADMIN — LISINOPRIL 10 MG: 10 TABLET ORAL at 08:58

## 2018-03-21 RX ADMIN — AMLODIPINE BESYLATE 10 MG: 10 TABLET ORAL at 08:58

## 2018-03-21 NOTE — PLAN OF CARE
Problem: Patient Care Overview  Goal: Plan of Care/Patient Progress Review  Outcome: Improving  Vital signs stable.  Alert and oriented x4.  Lungs clear.  Bowel sounds hypooactive, no stools, no nausea.  Ambulated with sba of 1, asymptomatic.  Hgb 11.2.  Plan of care reviewed with pt.

## 2018-03-21 NOTE — PLAN OF CARE
Problem: Gastrointestinal Bleeding (Adult)  Goal: Signs and Symptoms of Listed Potential Problems Will be Absent, Minimized or Managed (Gastrointestinal Bleeding)  Signs and symptoms of listed potential problems will be absent, minimized or managed by discharge/transition of care (reference Gastrointestinal Bleeding (Adult) CPG).   Outcome: Improving  Alert and oriented. Tolerating low fiber diet. Transfers with SBA in room. Voiding in adequate amounts. Had formed BM today. Bowel sounds hypoactive, passing flatus. Denies pain. Plans d/c to home with daughter today at 1600.

## 2018-03-21 NOTE — PLAN OF CARE
Problem: Patient Care Overview  Goal: Plan of Care/Patient Progress Review  Outcome: Adequate for Discharge Date Met: 03/21/18  Pt discharged to home.Discharge instructions were reviewed with pt and daughter.Personal belongings packed by pt.Left unit via wheelchair accompanied by daughter and LPN.

## 2018-03-21 NOTE — PLAN OF CARE
Problem: Patient Care Overview  Goal: Plan of Care/Patient Progress Review  Outcome: Improving  A&O. VSS. SBA. Denied nausea. No blood in stool. Voiding adequately.

## 2018-03-23 NOTE — DISCHARGE SUMMARY
Discharge Summary  [facility]     Rebeca Izaguirre MRN# [id number]   YOB: 1928 Age: 89 year old     Date of Admission:  3/18/2018  Date of Discharge:  3/21/2018  4:45 PM  Admitting Physician:  Chivo Roger MD  Discharge Physician:  Latrell Lozano MD  Discharging Service:  Hospitalist    Primary Provider: Alexa Prado            Discharge Diagnosis:   #Acute colitis.  Most likely mild ischemic colitis.  Resolving  # Hematochezia.  Secondary to the colitis.  Resolving         Secondary Diagnoses:   Hypertension  Diabetes            Discharge Disposition:   Discharged to home            Brief History of Illness:   Please refer to admission H&P for details of presenting illness. In brief, this is a  89 year old female who presented with blood in the stools and abdominal pain.          Hospital Course:     This is a very pleasant 89-year-old female who is cognitively intact, independent and functional with a history of hypertension, type 2 diabetes diet-controlled, and hypercholesterolemia who presented with hematochezia and some abdominal pain.  Workup in the ED with the CT of the abdomen demonstrates diffuse diverticulosis along with descending colon wall thickening consistent with colitis.     Patient was seen by GI, felt to be mild ischemic colitis.  She was treated conservatively with IV fluids.  Slow advancement in her diet.  At this time she is tolerating solid foods.  Her bowel movements are getting better, she tells me that there is no longer blood in it and the mucus is also going away.  No pain at this time  GI discussed with pt and family about outpatient colonoscopy to r/o malignancy and they declined.  Patient does not wish for much aggressive measures.    She is doing well and appears stable for discharge home.      Discharge Procedure Orders  Reason for your hospital stay   Order Comments: Colitis     Follow-up and recommended labs and tests    Order Comments:  Follow up with primary care provider, Alexa Prado, in 1-2 weeks for hospital follow- up.     Diet   Order Comments: Follow this diet upon discharge:     Low Fiber Diet for one week, then advance to regular as tolerated   Order Specific Question Answer Comments   Is discharge order? Yes               Discharge Medications:     Discharge Medication List as of 3/21/2018  3:45 PM      CONTINUE these medications which have NOT CHANGED    Details   AMLODIPINE BESYLATE PO Take 10 mg by mouth daily , Historical      LISINOPRIL PO Take 10 mg by mouth daily , Historical      Multiple Vitamins-Minerals (PRESERVISION AREDS) CAPS Take 1 tablet by mouth 2 times daily , Historical      VITAMIN D, CHOLECALCIFEROL, PO Take 1,000 Units by mouth daily , Historical      SIMVASTATIN PO Take 10 mg by mouth At Bedtime , Historical      ASPIRIN PO Take 81 mg by mouth daily , Historical                    Condition on Discharge:   Discharge condition: Stable   Discharge vitals: Blood pressure 144/64, pulse 65, temperature 97.4  F (36.3  C), temperature source Oral, resp. rate 16, height 1.524 m (5'), weight 54.1 kg (119 lb 4.8 oz), SpO2 99 %, not currently breastfeeding.   Patient appears alert comfortable without any signs of distress   Lungs are clear to auscultation bilaterally, Abdomen is soft and non-tender         Consultations:   GASTROENTEROLOGY IP CONSULT  CARE COORDINATOR IP CONSULT  CARE COORDINATOR IP CONSULT                 Pending Results:   Unresulted Labs Ordered in the Past 30 Days of this Admission     No orders found from 1/17/2018 to 3/19/2018.                    Procedures / Labs / Imaging:     Results for orders placed or performed during the hospital encounter of 03/18/18   CT Abdomen Pelvis w Contrast    Narrative    CT ABDOMEN AND PELVIS WITH CONTRAST 3/18/2018 12:24 PM     HISTORY: Hematochezia, abdominal pain.     CONTRAST DOSE:  61mL Isovue-370    Radiation dose for this scan was reduced using automated  exposure  control, adjustment of the mA and/or kV according to patient size, or  iterative reconstruction technique.    FINDINGS:  Cholecystectomy surgical clips are noted. Prominent  descending and sigmoid colonic diverticulosis is noted. Diffuse  thickening of the descending colonic wall may represent superimposed  nonspecific colitis. There is a normal-appearing appendix. No evidence  of bowel obstruction. Trace peritoneal fluid is noted within the  pelvis. No free peritoneal air. The liver, spleen, kidneys, and  adrenal glands appear within normal limits. The pancreas appears to be  atrophied.      Impression    IMPRESSION:  1. Marked descending and sigmoid colonic diverticulosis.  2. Diffuse wall thickening of the descending colon worrisome for  nonspecific colitis.  3. Nonspecific trace peritoneal fluid in the pelvis.    CAROL RAYGOZA MD       Plan of care and discharge instructions were reviewed with the patient in great detail. All questions were answered appropriately. Patient is comfortable with the plan and agrees with it. All new medications including the side effects were reviewed with the patient.     Total time spent in face to face contact with the patient and coordinating discharge was: less than 30 Minutes      Allergies:    No Known Allergies      CC: Alexa Prado

## 2022-09-05 ENCOUNTER — APPOINTMENT (OUTPATIENT)
Dept: GENERAL RADIOLOGY | Facility: CLINIC | Age: 87
End: 2022-09-05
Attending: PHYSICIAN ASSISTANT
Payer: MEDICARE

## 2022-09-05 ENCOUNTER — HOSPITAL ENCOUNTER (EMERGENCY)
Facility: CLINIC | Age: 87
Discharge: HOME | End: 2022-09-05
Attending: PHYSICIAN ASSISTANT | Admitting: PHYSICIAN ASSISTANT
Payer: MEDICARE

## 2022-09-05 VITALS
HEART RATE: 105 BPM | TEMPERATURE: 97.5 F | RESPIRATION RATE: 20 BRPM | DIASTOLIC BLOOD PRESSURE: 74 MMHG | SYSTOLIC BLOOD PRESSURE: 133 MMHG | OXYGEN SATURATION: 96 %

## 2022-09-05 DIAGNOSIS — L03.113 CELLULITIS OF RIGHT UPPER EXTREMITY: ICD-10-CM

## 2022-09-05 DIAGNOSIS — S41.111A SKIN TEAR OF RIGHT UPPER EXTREMITY: ICD-10-CM

## 2022-09-05 LAB
ANION GAP SERPL CALCULATED.3IONS-SCNC: 13 MMOL/L (ref 7–15)
BASOPHILS # BLD AUTO: 0.1 10E3/UL (ref 0–0.2)
BASOPHILS NFR BLD AUTO: 1 %
BUN SERPL-MCNC: 15.9 MG/DL (ref 8–23)
CALCIUM SERPL-MCNC: 9.6 MG/DL (ref 8.2–9.6)
CHLORIDE SERPL-SCNC: 98 MMOL/L (ref 98–107)
CREAT SERPL-MCNC: 0.88 MG/DL (ref 0.51–0.95)
DEPRECATED HCO3 PLAS-SCNC: 26 MMOL/L (ref 22–29)
EOSINOPHIL # BLD AUTO: 0.1 10E3/UL (ref 0–0.7)
EOSINOPHIL NFR BLD AUTO: 1 %
ERYTHROCYTE [DISTWIDTH] IN BLOOD BY AUTOMATED COUNT: 13.1 % (ref 10–15)
GFR SERPL CREATININE-BSD FRML MDRD: 61 ML/MIN/1.73M2
GLUCOSE SERPL-MCNC: 180 MG/DL (ref 70–99)
HCT VFR BLD AUTO: 38.9 % (ref 35–47)
HGB BLD-MCNC: 12.3 G/DL (ref 11.7–15.7)
IMM GRANULOCYTES # BLD: 0.1 10E3/UL
IMM GRANULOCYTES NFR BLD: 0 %
LYMPHOCYTES # BLD AUTO: 1.6 10E3/UL (ref 0.8–5.3)
LYMPHOCYTES NFR BLD AUTO: 13 %
MCH RBC QN AUTO: 26.5 PG (ref 26.5–33)
MCHC RBC AUTO-ENTMCNC: 31.6 G/DL (ref 31.5–36.5)
MCV RBC AUTO: 84 FL (ref 78–100)
MONOCYTES # BLD AUTO: 1.1 10E3/UL (ref 0–1.3)
MONOCYTES NFR BLD AUTO: 9 %
NEUTROPHILS # BLD AUTO: 9.3 10E3/UL (ref 1.6–8.3)
NEUTROPHILS NFR BLD AUTO: 76 %
NRBC # BLD AUTO: 0 10E3/UL
NRBC BLD AUTO-RTO: 0 /100
PLATELET # BLD AUTO: 303 10E3/UL (ref 150–450)
POTASSIUM SERPL-SCNC: 4.2 MMOL/L (ref 3.4–5.3)
RBC # BLD AUTO: 4.65 10E6/UL (ref 3.8–5.2)
SODIUM SERPL-SCNC: 137 MMOL/L (ref 136–145)
WBC # BLD AUTO: 12.2 10E3/UL (ref 4–11)

## 2022-09-05 PROCEDURE — 96365 THER/PROPH/DIAG IV INF INIT: CPT

## 2022-09-05 PROCEDURE — 250N000011 HC RX IP 250 OP 636: Performed by: PHYSICIAN ASSISTANT

## 2022-09-05 PROCEDURE — 80048 BASIC METABOLIC PNL TOTAL CA: CPT | Performed by: PHYSICIAN ASSISTANT

## 2022-09-05 PROCEDURE — 90471 IMMUNIZATION ADMIN: CPT | Performed by: PHYSICIAN ASSISTANT

## 2022-09-05 PROCEDURE — 90715 TDAP VACCINE 7 YRS/> IM: CPT | Performed by: PHYSICIAN ASSISTANT

## 2022-09-05 PROCEDURE — 73090 X-RAY EXAM OF FOREARM: CPT | Mod: RT

## 2022-09-05 PROCEDURE — 36415 COLL VENOUS BLD VENIPUNCTURE: CPT | Performed by: PHYSICIAN ASSISTANT

## 2022-09-05 PROCEDURE — 85025 COMPLETE CBC W/AUTO DIFF WBC: CPT | Performed by: PHYSICIAN ASSISTANT

## 2022-09-05 PROCEDURE — 99284 EMERGENCY DEPT VISIT MOD MDM: CPT | Mod: 25

## 2022-09-05 RX ORDER — CEFAZOLIN SODIUM 2 G/100ML
2 INJECTION, SOLUTION INTRAVENOUS ONCE
Status: COMPLETED | OUTPATIENT
Start: 2022-09-05 | End: 2022-09-05

## 2022-09-05 RX ORDER — CEPHALEXIN 500 MG/1
500 CAPSULE ORAL 4 TIMES DAILY
Qty: 28 CAPSULE | Refills: 0 | Status: SHIPPED | OUTPATIENT
Start: 2022-09-05 | End: 2022-09-12

## 2022-09-05 RX ORDER — FENTANYL CITRATE 50 UG/ML
25 INJECTION, SOLUTION INTRAMUSCULAR; INTRAVENOUS ONCE
Status: DISCONTINUED | OUTPATIENT
Start: 2022-09-05 | End: 2022-09-05 | Stop reason: HOSPADM

## 2022-09-05 RX ORDER — BUPIVACAINE HYDROCHLORIDE 5 MG/ML
INJECTION, SOLUTION PERINEURAL
Status: DISCONTINUED
Start: 2022-09-05 | End: 2022-09-05 | Stop reason: HOSPADM

## 2022-09-05 RX ADMIN — CLOSTRIDIUM TETANI TOXOID ANTIGEN (FORMALDEHYDE INACTIVATED), CORYNEBACTERIUM DIPHTHERIAE TOXOID ANTIGEN (FORMALDEHYDE INACTIVATED), BORDETELLA PERTUSSIS TOXOID ANTIGEN (GLUTARALDEHYDE INACTIVATED), BORDETELLA PERTUSSIS FILAMENTOUS HEMAGGLUTININ ANTIGEN (FORMALDEHYDE INACTIVATED), BORDETELLA PERTUSSIS PERTACTIN ANTIGEN, AND BORDETELLA PERTUSSIS FIMBRIAE 2/3 ANTIGEN 0.5 ML: 5; 2; 2.5; 5; 3; 5 INJECTION, SUSPENSION INTRAMUSCULAR at 16:58

## 2022-09-05 RX ADMIN — CEFAZOLIN SODIUM 2 G: 2 INJECTION, SOLUTION INTRAVENOUS at 18:01

## 2022-09-05 ASSESSMENT — ACTIVITIES OF DAILY LIVING (ADL): ADLS_ACUITY_SCORE: 33

## 2022-09-05 NOTE — ED TRIAGE NOTES
Pt had mechanical fall 2 days ago, 3 lacerations to right arm present. Comes from clinic d/t concern for tissue necrosis. Denies hitting head or LOC. CMS intact. No decreased ROM of RUE. Tetanus not UTD.

## 2022-09-05 NOTE — ED PROVIDER NOTES
History     Chief Complaint:  Fall and Laceration      HPI   Rebeca Izaguirre is a 89 year old female who presents with fall and right arm laceration.  The patient tripped over a pot on the floor 2 days ago falling onto her right forearm.  She did not hit her head or lose consciousness and remembers the entire event.  She tried to bandage the wound of her self.  Today her daughter came over looked at the wound and took her to urgent care where she was referred here for further evaluation.  Patient denies any other injuries from the fall.  She denies fever or chills and feels otherwise well.  She does not feel dizzy or have neck pain.  She is not diabetic.  She notes that the arm hurts but she can still bend the elbow wrist and fingers normally.    Review of Systems   All other systems reviewed and are negative.    Allergies:  Egg Yolk      Medications:    cephALEXin (KEFLEX) 500 MG capsule  AMLODIPINE BESYLATE PO  ASPIRIN PO  LISINOPRIL PO  Multiple Vitamins-Minerals (PRESERVISION AREDS) CAPS  SIMVASTATIN PO  VITAMIN D, CHOLECALCIFEROL, PO        Past Medical History:    Past Medical History:   Diagnosis Date     Hypertension      Patient Active Problem List    Diagnosis Date Noted     GI bleeding 03/18/2018     Priority: Medium        Past Surgical History:    Reviewed.  No pertinent H    Social History:  Alexa Prado  The patient presents to the ED with daughter    Physical Exam     Patient Vitals for the past 24 hrs:   BP Temp Temp src Pulse Resp SpO2   09/05/22 1843 -- -- -- -- -- 96 %   09/05/22 1630 133/74 97.5  F (36.4  C) Temporal 105 20 100 %       Physical Exam  General: Awake, alert, non-toxic.  Head:  Scalp is NC/AT  Eyes:  Conjunctiva normal, PERRL  ENT:  The external nose and ears are normal.   Neck:  Normal range of motion without rigidity.  CV:  Regular rate and rhythm    No pathologic murmur, rubs, or gallops.  Resp:  Breath sounds are clear bilaterally    Non-labored, no retractions or  accessory muscle use  Abdomen: Abdomen is soft, no distension, no tenderness, no masses.  MS:  No lower extremity edema or asymmetric calf swelling. No midline cervical, thoracic, or lumbar tenderness normal ROM in right shoulder, elbow, wrist, fingers.  No bony tenderness to palpation.  Skin:  There are several skin tears to the right forearm the largest 1 which extends down into the subcutaneous tissue with some associated dried blood and bruising this measures approximately 3 x 3 cm.  There are 3 smaller very superficial skin tears adjacent.  There is small amount of surrounding redness around these with mild warmth and tenderness.  No fluctuance or streaking.  2+ radial pulses bilaterally with cap refill less than 2 seconds in fingers.  Neuro: Alert and oriented.  GCS 15 No gross motor deficits.  No facial asymmetry.  Normal strength of flexion and extension in wrist fingers and hand.  Psych:  Awake. Alert. Normal affect. Appropriate interactions.    Emergency Department Course   Imaging:  XR Forearm Right 2 Views   Final Result   IMPRESSION: Soft tissue defect in the proximal ulnar diaphyseal region. No foreign body. No fractures are evident. Osteopenia. Degenerative changes in the wrist. No elbow joint effusion.        Report per radiology    Laboratory:  Labs Ordered and Resulted from Time of ED Arrival to Time of ED Departure   BASIC METABOLIC PANEL - Abnormal       Result Value    Creatinine 0.88      Sodium 137      Potassium 4.2      Urea Nitrogen 15.9      Chloride 98      Carbon Dioxide (CO2) 26      Anion Gap 13      Glucose 180 (*)     GFR Estimate 61      Calcium 9.6     CBC WITH PLATELETS AND DIFFERENTIAL - Abnormal    WBC Count 12.2 (*)     RBC Count 4.65      Hemoglobin 12.3      Hematocrit 38.9      MCV 84      MCH 26.5      MCHC 31.6      RDW 13.1      Platelet Count 303      % Neutrophils 76      % Lymphocytes 13      % Monocytes 9      % Eosinophils 1      % Basophils 1      % Immature  Granulocytes 0      NRBCs per 100 WBC 0      Absolute Neutrophils 9.3 (*)     Absolute Lymphocytes 1.6      Absolute Monocytes 1.1      Absolute Eosinophils 0.1      Absolute Basophils 0.1      Absolute Immature Granulocytes 0.1      Absolute NRBCs 0.0         Emergency Department Course:    Reviewed:  I reviewed nursing notes, vitals, past medical history, Care Everywhere and [facility]    Assessments:   I obtained history and examined the patient as noted above.    I rechecked the patient and explained findings.     Wound anesthetized cleaned, steri-stripped and dressed.    Interventions:  Medications   fentaNYL (PF) (SUBLIMAZE) injection 25 mcg (25 mcg Intravenous Not Given 22)   bupivacaine (MARCAINE) 0.5 % injection (has no administration in time range)   Tdap (tetanus-diphtheria-acell pertussis) (ADACEL) injection 0.5 mL (0.5 mLs Intramuscular Given 22)   ceFAZolin (ANCEF) intermittent infusion 2 g in 100 mL dextrose PRE-MIX (0 g Intravenous Stopped 22)       Disposition:  The patient was discharged to home.     Impression & Plan      Medical Decision Makin-year-old female presents after mechanical fall 2 days ago with skin tears and wound to the right arm.  Denies any other traumatic injuries or loss of consciousness.  Exam reveals skin tears as noted above.  No evidence of fracture, foreign body, tendon injury, or neurovascular compromise.  There is some questionable early surrounding redness concerning for cellulitis.  Wound was anesthetized thoroughly cleaned dressed and Steri-Stripped.  Explained that would not suture given concern for infection as well as age of wound.  Tetanus was updated.  Blood work notable for mild leukocytosis otherwise unremarkable and does not appear septic or systemically ill would not admit at this time but did give a dose of Ancef prior to discharge will discharge on Keflex and have her follow-up with wound clinic.  Return precautions given for new or  worsening symptoms spreading redness fluctuance increased pain fever chills or other concerns.  Daughter and patient will call wound clinic tomorrow to schedule follow-up.  Critical Care time:  none    Covid-19  Rebeca Izaguirre was evaluated during a global COVID-19 pandemic, which necessitated consideration that the patient might be at risk for infection with the SARS-CoV-2 virus that causes COVID-19.   Applicable protocols for evaluation were followed during the patient's care.   COVID-19 was considered as part of the patient's evaluation.    Diagnosis:    ICD-10-CM    1. Skin tear of right upper extremity  S41.111A    2. Cellulitis of right upper extremity  L03.113        Discharge Medications:  Discharge Medication List as of 9/5/2022  6:37 PM      START taking these medications    Details   cephALEXin (KEFLEX) 500 MG capsule Take 1 capsule (500 mg) by mouth 4 times daily for 7 days, Disp-28 capsule, R-0, E-Prescribe               Scribe Disclosure:  Alvin GARCIA PA-C, am serving as a scribe at 6:56 PM on 9/5/2022 to document services personally performed by  based on my observations and the provider's statements to me.      Alvin Brown PA-C  09/05/22 [id number]

## 2022-09-06 ENCOUNTER — TELEPHONE (OUTPATIENT)
Dept: WOUND CARE | Facility: CLINIC | Age: 87
End: 2022-09-06

## 2022-09-06 NOTE — TELEPHONE ENCOUNTER
Consult received via phone from patients daughter for wound of the right forearm    Patient scheduled with Rebecca Joseph PA-C on 9/30/22.     Discussed that the steri strips can be left on until they fall off, use nonadherent pads for drainage, and to wash with mild soap and water to clean the wound. Writer gave other [facility] wound clinic numbers to see if she could get in sooner at a different clinic as well. Debora will call back to cancel if she finds a sooner appointment.

## 2022-09-06 NOTE — TELEPHONE ENCOUNTER
Patient's daughter is requesting an appointment for the patient for a right arm wound. Patient was recently seen in the ER and advised to follow up with a wound clinic. Patient's daughter is also asking for any wound care instructions (if possible) for caring for the wound until they could be seen as they were not given any at the ER.     Debora (patient's daughter) [phone]   MED/SURG

## 2022-09-30 ENCOUNTER — HOSPITAL ENCOUNTER (OUTPATIENT)
Dept: WOUND CARE | Facility: CLINIC | Age: 87
Discharge: HOME | End: 2022-09-30
Attending: PHYSICIAN ASSISTANT | Admitting: PHYSICIAN ASSISTANT
Payer: MEDICARE

## 2022-09-30 VITALS — DIASTOLIC BLOOD PRESSURE: 80 MMHG | SYSTOLIC BLOOD PRESSURE: 161 MMHG | TEMPERATURE: 97.3 F

## 2022-09-30 DIAGNOSIS — S41.111S: ICD-10-CM

## 2022-09-30 PROCEDURE — 97602 WOUND(S) CARE NON-SELECTIVE: CPT

## 2022-09-30 PROCEDURE — 99204 OFFICE O/P NEW MOD 45 MIN: CPT | Performed by: PHYSICIAN ASSISTANT

## 2022-09-30 RX ORDER — MUPIROCIN 20 MG/G
OINTMENT TOPICAL
Qty: 22 G | Refills: 0 | Status: SHIPPED | OUTPATIENT
Start: 2022-09-30

## 2022-09-30 NOTE — PROGRESS NOTES
[facility]    ASSESSMENT:   1. full thickness traumatic wounds of R arm  2. Impetigo of R arm    PLAN/DISCUSSION:   1. Instructed patient to call if redness and pain don't improve by Monday, ok to send her in doxycycline 100mg BID x 10days to her pharmacy if she calls.  2. Wound care plan: mupirocin, xeroform, spandage change daily  3. Nutrition: vit D 5kIU/day, high protein diet  4. See bottom of note for detailed wound care and patient instructions    HISTORY OF PRESENT ILLNESS:   Rebeca Izaguirre is a 89 year old female who fell and lacerated her arm on 9/3. She was treated with cephalexin initially and has been treating it with bacitracin and xeroforom. She thought it was getting better initially but now more red and tender. She is not immunosuppressed and remarkably healthy for her age.     VITALS: BP (!) 161/80 (BP Location: Left arm, Patient Position: Chair)   Temp 97.3  F (36.3  C) (Temporal)      PHYSICAL EXAM:  GENERAL: Patient is alert and oriented and in no acute distress  INTEGUMENTARY:   Wound (used by OP [facility] only) 09/30/22 1322 Right lower;lateral arm (Active)   Thickness/Stage full thickness 09/30/22 1326   Base slough 09/30/22 1326   Periwound intact;pink;edematous;other (see comments) 09/30/22 1326   Periwound Temperature warm 09/30/22 1326   Periwound Skin Turgor soft 09/30/22 1326   Edges open 09/30/22 1326   Length (cm) 1.9 09/30/22 1326   Width (cm) 2.6 09/30/22 1326   Depth (cm) 0.4 09/30/22 1326   Wound (cm^2) 4.94 cm^2 09/30/22 1326   Wound Volume (cm^3) 1.98 cm^3 09/30/22 1326   Drainage Characteristics/Odor serosanguineous 09/30/22 1326   Drainage Amount moderate 09/30/22 1326   Care, Wound chemical cautery applied;non-select wound debridement performed 09/30/22 1326               MDM: 50 minutes were spent on the date of the visit reviewing previous chart notes, evaluating patient and developing the treatment plan, this excludes any time spent on procedures.      PATIENT INSTRUCTIONS      Further instructions from your care team       Rebeca MONSE Dmitriy      1/28/1928    You received chemical cautery today to your wound. The appearance with soften within     If your rash area does not look and feel improved by Monday, please call the clinic for consideration of oral antibiotic.    Wound Dressing Change: right lower lateral arm-change daily  After cleansing with mild unscented soap and water like baby soap, then apply dressing:   -apply Mupirocin ointment to small blistered rash areas on both sides of the wound and the wound itself  -apply xeroform over the rash areas and the wound  -apply two 4x4 gauze over the wound  -secure with conforming roll gauze  -apply compression sleeve or a length of Spandage size 5 from wrist to at/above elbow     Maite Joseph PA-C September 30, 2022    Call us at [phone] if you have any questions about your wounds, have redness or swelling around your wound, have a fever of 101 or greater or if you have any other problems or concerns. We answer the phone Monday through Friday 8 am to 4 pm, please leave a message as we check the voicemail frequently throughout the day.     If you had a positive experience please indicate that on your patient satisfaction survey form that [facility] will be sending you.    It was a pleasure meeting with you today.  Thank you for allowing me and my team the privilege of caring for you today.  YOU are the reason we are here, and I truly hope we provided you with the excellent service you deserve.  Please let us know if there is anything else we can do for you so that we can be sure you are leaving completely satisfied with your care experience.      If you have any billing related questions please call the [facility] Business office at [phone]. The clinic staff does not handle billing related matters.    If you are scheduled to have a follow up appointment, you will receive a reminder call the  day before your visit. On the appointment day please arrive 15 minutes prior to your appointment time. If you are unable to keep that appointment, please call the clinic to cancel or reschedule. If you are more than 10 minutes late or greater for your appointment, the clinic policy is that you may be asked to reschedule.         Durable Medical Equipment Wound Care Orders     Wound Care Order for DME - ONLY FOR DME   As directed      DME Provider: Abi Anderson Comment - [phone]    Wound Supply Order Options: Complex Wound    Optional: .dmewound can be used to pull in order specific information into documentation    Wound Number: Wound 1    Wound 1 Location: right lower lateral arm    Wound 1 Dressing Change Frequency: Daily    Wound 1 Length of Need: 30 days    Wound 1 - Dressing Supplies:  Other Dressing Supplies  Wrap/Gauze  Tape/Securing       Wound 1 - Wrap/Gauze Types:  Rolls  Loafs       Wound 1 - Roll Type: Conforming Roll Gauze []    Wound 1 - Conforming Roll Gauze Size: 4 x 4.1    Wound 1 - Conforming Roll Gauze Quantity: Other (Comments) Comment - 12    Wound 1 - Loaf Type: Gauze Loaf []    Wound 1 - Gauze Loaf Size: 4 x 4    Wound 1 - Gauze Loaf Quantity: Other (Comments) Comment - 30    Wound 1 - Tape Type: Medipore []    Wound 1 - Medipore Size: 2 x 10    Wound 1 - Medipore Quantity: 1 Roll    Wound 1 - Tubular Dressing Type: Other (Comments)    Wound 1 - Other Tubular Dressing Size: spandage size 5    Wound 1 - Other Tubular Dressing Quantity: 3 yds    Wound 1 - Other Dressing Supplies Type: Non-Adherent    Wound 1 - Non-Adherent Type: Curad Xeroform [/]    Wound 1 - Curad Xeroform Size: 5x9    Wound 1 - Curad Xeroform Quantity: Other (Comments) Comment - 2    Lacerations of multiple sites of right arm, sequela          Electronically signed by Maite Joseph PA-C on September 30, 2022

## 2022-09-30 NOTE — PROGRESS NOTES
Patient arrived for wound care visit. Wound Care Nurse time spent evaluating patient record, completed a full evaluation and documented wound(s) & jose-wound skin; provided recommendation based on treatment plan. Applied dressing, reviewed discharge instructions, patient education, and discussed plan of care with appropriate medical team staff members and patient and/or family members.

## 2022-09-30 NOTE — DISCHARGE INSTRUCTIONS
Rebeca Izaguirre      1/28/1928      You received chemical cautery today to your wound. The appearance with soften within     If your rash area does not look and feel improved by Monday, please call the clinic for consideration of oral antibiotic.    Wound Dressing Change: right lower lateral arm-change daily  After cleansing with mild unscented soap and water like baby soap, then apply dressing:   -apply Mupirocin ointment to small blistered rash areas on both sides of the wound and the wound itself  -apply xeroform over the rash areas and the wound  -apply two 4x4 gauze over the wound  -secure with conforming roll gauze  -apply compression sleeve or a length of Spandage size 5 from wrist to at/above elbow     Maite Joseph PA-C September 30, 2022    Call us at [phone] if you have any questions about your wounds, have redness or swelling around your wound, have a fever of 101 or greater or if you have any other problems or concerns. We answer the phone Monday through Friday 8 am to 4 pm, please leave a message as we check the voicemail frequently throughout the day.     If you had a positive experience please indicate that on your patient satisfaction survey form that [facility] will be sending you.    It was a pleasure meeting with you today.  Thank you for allowing me and my team the privilege of caring for you today.  YOU are the reason we are here, and I truly hope we provided you with the excellent service you deserve.  Please let us know if there is anything else we can do for you so that we can be sure you are leaving completely satisfied with your care experience.      If you have any billing related questions please call the [facility] Business office at [phone]. The clinic staff does not handle billing related matters.    If you are scheduled to have a follow up appointment, you will receive a reminder call the day before your visit. On the appointment day please arrive 15 minutes prior  to your appointment time. If you are unable to keep that appointment, please call the clinic to cancel or reschedule. If you are more than 10 minutes late or greater for your appointment, the clinic policy is that you may be asked to reschedule.

## 2022-10-10 ENCOUNTER — HOSPITAL ENCOUNTER (OUTPATIENT)
Dept: WOUND CARE | Facility: CLINIC | Age: 87
Discharge: HOME | End: 2022-10-10
Attending: SURGERY | Admitting: SURGERY
Payer: MEDICARE

## 2022-10-10 VITALS — HEART RATE: 95 BPM | SYSTOLIC BLOOD PRESSURE: 152 MMHG | TEMPERATURE: 96.9 F | DIASTOLIC BLOOD PRESSURE: 77 MMHG

## 2022-10-10 DIAGNOSIS — S41.111S: Primary | ICD-10-CM

## 2022-10-10 PROCEDURE — G0463 HOSPITAL OUTPT CLINIC VISIT: HCPCS

## 2022-10-10 PROCEDURE — 99212 OFFICE O/P EST SF 10 MIN: CPT | Performed by: SURGERY

## 2022-10-10 NOTE — DISCHARGE INSTRUCTIONS
Rebeca Izaguirre      1/28/1928        [facility]  [address] [facility] [address], [facility] [address]  Appointment Phone [phone]     Rebeca J Dmitriy      1/28/1928    Your wound is healed!! Dressings are no longer required.     Skin care right lower lateral arm-  Rash is resolved.  You no longer need a dressing.  If you would like to continue protecting the area with sleeve, you may.  No return visit needed.    Wound Clinic follow up in the future if there are any wound concerns     Abdifatah Franklin M.D. October 10, 2022    A DME order was not completed because supplies were not needed      Abdifatah Franklin M.D. October 10, 2022    Call us at [phone] if you have any questions about your wounds, have redness or swelling around your wound, have a fever of 101 or greater or if you have any other problems or concerns. We answer the phone Monday through Friday 8 am to 4 pm, please leave a message as we check the voicemail frequently throughout the day.     If you had a positive experience please indicate that on your patient satisfaction survey form that [facility] will be sending you.    It was a pleasure meeting with you today.  Thank you for allowing me and my team the privilege of caring for you today.  YOU are the reason we are here, and I truly hope we provided you with the excellent service you deserve.  Please let us know if there is anything else we can do for you so that we can be sure you are leaving completely satisfied with your care experience.      If you have any billing related questions please call the [facility] Business office at [phone]. The clinic staff does not handle billing related matters.    If you are scheduled to have a follow up appointment, you will receive a reminder call the day before your visit. On the appointment day please arrive 15 minutes prior to your appointment time. If you are unable to keep that appointment, please call the  clinic to cancel or reschedule. If you are more than 10 minutes late or greater for your appointment, the clinic policy is that you may be asked to reschedule.

## 2022-10-10 NOTE — PROGRESS NOTES
Patient arrived for wound care visit.  Wound Care Nurse time spent evaluating patient record, and completed a full evaluation; provided recommendation based on treatment plan.   Reviewed discharge instructions, patient education, and discussed plan of care with appropriate medical team staff members and patient and/or family members.

## 2022-10-10 NOTE — PROGRESS NOTES
[facility] [place] Progress Note    Subject: Rebeca Izaguirre is 89 years old.  She fell on 9/3/2022 after tripping over a footstool.  She suffered a right arm laceration X3 . She was seen in the ED on 9/5/2022.  No other injuries.  Given a tetanus shot.  Placed on Keflex.  Steri-Strips applied.  Topical antibiotic and wrap to the area.  Recommend to see us here at the wound clinic.    She was seen on 9/30/2022.  She has had 3 initial lacerations in the proximal distal had essentially healed over.  However, the mid ulnar forearm ulceration had a large amount of bioburden and wound separation.  She did appear to have a rash felt perhaps to be topical antibiotic and switch to Bactroban over the site followed by Xeroform gauze and rolled gauze.  Also Spandage to hold this in place.    She is done very well since that time.  No pain at all.        PMH:   Past Medical History:   Diagnosis Date     Hypertension      Patient Active Problem List   Diagnosis     GI bleeding     Lacerations of multiple sites of right arm, sequela     Social Hx:   Social History     Socioeconomic History     Marital status:      Spouse name: Not on file     Number of children: Not on file     Years of education: Not on file     Highest education level: Not on file   Occupational History     Not on file   Tobacco Use     Smoking status: Never     Smokeless tobacco: Never   Substance and Sexual Activity     Alcohol use: No     Drug use: No     Sexual activity: Not on file   Other Topics Concern     Not on file   Social History Narrative     Not on file     Social Determinants of Health     Financial Resource Strain: Not on file   Food Insecurity: Not on file   Transportation Needs: Not on file   Physical Activity: Not on file   Stress: Not on file   Social Connections: Not on file   Intimate Partner Violence: Not on file   Housing Stability: Not on file       Surgical Hx: No past surgical history on  "file.    Allergies:    Allergies   Allergen Reactions     Egg Yolk Hives     \"I never get flu shots.\"         Medications:   Current Outpatient Medications   Medication     AMLODIPINE BESYLATE PO     ASPIRIN PO     LISINOPRIL PO     Multiple Vitamins-Minerals (PRESERVISION AREDS) CAPS     mupirocin (BACTROBAN) 2 % external ointment     SIMVASTATIN PO     study - aspirin, IDS# [id number], 81 mg tablet     VITAMIN D, CHOLECALCIFEROL, PO     No current facility-administered medications for this encounter.       Labs:   Recent Labs   Lab Test 09/05/22  1744 03/19/18  0044 03/18/18  1826 03/18/18  1010   ALBUMIN  --   --   --  3.9   HGB 12.3   < > 12.6 12.6   INR  --   --   --  1.06   WBC 12.2*   < >  --  20.0*   A1C  --   --  7.3*  --     < > = values in this interval not displayed.     Creatinine   Date Value Ref Range Status   09/05/2022 0.88 0.51 - 0.95 mg/dL Final   03/19/2018 0.77 0.52 - 1.04 mg/dL Final     GFR Estimate   Date Value Ref Range Status   09/05/2022 61 >60 mL/min/1.73m2 Final     Comment:     Effective December 21, 2021 eGFRcr in adults is calculated using the 2021 CKD-EPI creatinine equation which includes age and gender (Adeola et al., NEJ, DOI: 10.1056/IKXBga5848650)   03/19/2018 71 >60 mL/min/1.7m2 Final     Comment:     Non  GFR Calc     GFR Estimate If Black   Date Value Ref Range Status   03/19/2018 85 >60 mL/min/1.7m2 Final     Comment:      GFR Calc     WBC   Date Value Ref Range Status   03/20/2018 9.7 4.0 - 11.0 10e9/L Final     WBC Count   Date Value Ref Range Status   09/05/2022 12.2 (H) 4.0 - 11.0 10e3/uL Final     Lab Results   Component Value Date    CR 0.88 09/05/2022    CR 0.77 03/19/2018        Nutrition requirements were discussed with patient today.  Objective:  BP (!) 152/77 (BP Location: Left arm, Patient Position: Sitting)   Pulse 95   Temp 96.9  F (36.1  C) (Temporal)   Wound (used by OP [facility] only) 09/30/22 1322 Right lower;lateral arm (Active) "   Thickness/Stage full thickness 09/30/22 1326   Base closed/resurfaced 10/10/22 1047   Periwound intact;pink;edematous 10/10/22 1047   Periwound Temperature warm 10/10/22 1047   Periwound Skin Turgor soft 10/10/22 1047   Edges open 09/30/22 1326   Length (cm) 0 10/10/22 1047   Width (cm) 0 10/10/22 1047   Depth (cm) 0 10/10/22 1047   Wound (cm^2) 0 cm^2 10/10/22 1047   Wound Volume (cm^3) 0 cm^3 10/10/22 1047   Wound healing % 100 10/10/22 1047   Drainage Characteristics/Odor serosanguineous 09/30/22 1326   Drainage Amount none 10/10/22 1047   Care, Wound chemical cautery applied;non-select wound debridement performed 09/30/22 1326        General:  Patient is alert and orientated, no acute distress.  Here with her daughter.    Right forearm looks excellent.  Essentially complete healing of the larger ulcer though there is slight depression at the edges with intact epithelium at the base.  Very minimal induration.  No cellulitis.  The proximal and distal areas have matured very nicely.    Vascular: +3 radial pulse.  Excellent blood supply to forearm and hand.  No need for debridement            Impression: Healed traumatic ulcerations to right ulnar forearm.  Slight depression at skin edges but epithelialized and I do not feel that debridement is indicated to make this move since it should smooth without stone and this was discussed with the patient and her daughter who is with her today.  Reasonable for the next several days to apply the Bactroban to this area again with a gauze and the Spandage to protect the area.    Plan:  We will dress the wounds with Bactroban and Spandage for the next several days as the wound matures.    No need for follow-up with us.  Thank you       Abdifatah Franklin on 10/10/2022 at 11:15 AM      Dictated using Dragon voice recognition software which may result in transcription errors        Further instructions from your care team       Rebeca Izaguirre      1/28/1928        [facility]  [place] [facility]  [address] [facility] [address] [address]  Appointment Phone [phone]     Rebeca Izaguirre      1/28/1928    Your wound is healed!! Dressings are no longer required.     Skin care right lower lateral arm-  Rash is resolved.  You no longer need a dressing.  If you would like to continue protecting the area with sleeve, you may.  No return visit needed.    Wound Clinic follow up in the future if there are any wound concerns     Abdifatah Franklin M.D. October 10, 2022    A DME order was not completed because supplies were not needed      Abdifatah Franklin M.D. October 10, 2022    Call us at [phone] if you have any questions about your wounds, have redness or swelling around your wound, have a fever of 101 or greater or if you have any other problems or concerns. We answer the phone Monday through Friday 8 am to 4 pm, please leave a message as we check the voicemail frequently throughout the day.     If you had a positive experience please indicate that on your patient satisfaction survey form that [facility] will be sending you.    It was a pleasure meeting with you today.  Thank you for allowing me and my team the privilege of caring for you today.  YOU are the reason we are here, and I truly hope we provided you with the excellent service you deserve.  Please let us know if there is anything else we can do for you so that we can be sure you are leaving completely satisfied with your care experience.      If you have any billing related questions please call the [facility] Business office at [phone]. The clinic staff does not handle billing related matters.    If you are scheduled to have a follow up appointment, you will receive a reminder call the day before your visit. On the appointment day please arrive 15 minutes prior to your appointment time. If you are unable to keep that appointment, please call the clinic to cancel or reschedule. If you are more  than 10 minutes late or greater for your appointment, the clinic policy is that you may be asked to reschedule.